# Patient Record
Sex: FEMALE | Race: WHITE
[De-identification: names, ages, dates, MRNs, and addresses within clinical notes are randomized per-mention and may not be internally consistent; named-entity substitution may affect disease eponyms.]

---

## 2017-04-26 ENCOUNTER — APPOINTMENT (OUTPATIENT)
Dept: BARIATRICS | Facility: CLINIC | Age: 62
End: 2017-04-26

## 2017-05-25 ENCOUNTER — APPOINTMENT (OUTPATIENT)
Dept: BARIATRICS | Facility: CLINIC | Age: 62
End: 2017-05-25

## 2017-05-25 VITALS
BODY MASS INDEX: 38 KG/M2 | SYSTOLIC BLOOD PRESSURE: 142 MMHG | DIASTOLIC BLOOD PRESSURE: 94 MMHG | HEIGHT: 59.5 IN | WEIGHT: 191 LBS

## 2017-05-25 DIAGNOSIS — E66.3 OVERWEIGHT: ICD-10-CM

## 2017-05-25 DIAGNOSIS — Z87.19 PERSONAL HISTORY OF OTHER DISEASES OF THE DIGESTIVE SYSTEM: ICD-10-CM

## 2017-05-25 DIAGNOSIS — Z91.11 PATIENT'S NONCOMPLIANCE WITH DIETARY REGIMEN: ICD-10-CM

## 2017-05-25 DIAGNOSIS — K25.3 ACUTE GASTRIC ULCER W/OUT HEMORRHAGE OR PERFORATION: ICD-10-CM

## 2017-05-25 RX ORDER — LOSARTAN POTASSIUM AND HYDROCHLOROTHIAZIDE 25; 100 MG/1; MG/1
100-25 TABLET ORAL
Refills: 0 | Status: ACTIVE | COMMUNITY

## 2017-05-25 RX ORDER — ATORVASTATIN CALCIUM 10 MG/1
10 TABLET, FILM COATED ORAL
Refills: 0 | Status: ACTIVE | COMMUNITY

## 2017-05-25 RX ORDER — AMOXICILLIN 500 MG/1
500 CAPSULE ORAL
Qty: 20 | Refills: 0 | Status: COMPLETED | COMMUNITY
Start: 2017-02-16

## 2017-05-26 PROBLEM — Z91.11 NONCOMPLIANCE WITH DIETARY REGIMEN REQUIRED STATUS POST BARIATRIC SURGERY: Status: RESOLVED | Noted: 2017-01-02 | Resolved: 2017-05-26

## 2017-06-08 ENCOUNTER — APPOINTMENT (OUTPATIENT)
Dept: BARIATRICS/WEIGHT MGMT | Facility: CLINIC | Age: 62
End: 2017-06-08

## 2017-06-13 VITALS — HEIGHT: 59.5 IN | WEIGHT: 188 LBS | BODY MASS INDEX: 37.4 KG/M2

## 2017-07-13 ENCOUNTER — APPOINTMENT (OUTPATIENT)
Dept: BARIATRICS/WEIGHT MGMT | Facility: CLINIC | Age: 62
End: 2017-07-13

## 2017-09-27 ENCOUNTER — APPOINTMENT (OUTPATIENT)
Dept: BARIATRICS | Facility: CLINIC | Age: 62
End: 2017-09-27
Payer: COMMERCIAL

## 2017-09-27 VITALS
BODY MASS INDEX: 37.6 KG/M2 | HEIGHT: 59.5 IN | WEIGHT: 189 LBS | SYSTOLIC BLOOD PRESSURE: 138 MMHG | DIASTOLIC BLOOD PRESSURE: 92 MMHG

## 2017-09-27 PROCEDURE — 99213 OFFICE O/P EST LOW 20 MIN: CPT

## 2018-04-12 ENCOUNTER — APPOINTMENT (OUTPATIENT)
Dept: MRI IMAGING | Facility: CLINIC | Age: 63
End: 2018-04-12

## 2018-04-12 ENCOUNTER — OUTPATIENT (OUTPATIENT)
Dept: OUTPATIENT SERVICES | Facility: HOSPITAL | Age: 63
LOS: 1 days | End: 2018-04-12
Payer: COMMERCIAL

## 2018-04-12 DIAGNOSIS — Z41.9 ENCOUNTER FOR PROCEDURE FOR PURPOSES OTHER THAN REMEDYING HEALTH STATE, UNSPECIFIED: Chronic | ICD-10-CM

## 2018-04-12 DIAGNOSIS — Z98.89 OTHER SPECIFIED POSTPROCEDURAL STATES: Chronic | ICD-10-CM

## 2018-04-12 DIAGNOSIS — Z98.1 ARTHRODESIS STATUS: Chronic | ICD-10-CM

## 2018-04-12 DIAGNOSIS — Z00.8 ENCOUNTER FOR OTHER GENERAL EXAMINATION: ICD-10-CM

## 2018-04-12 PROCEDURE — 73721 MRI JNT OF LWR EXTRE W/O DYE: CPT | Mod: 26,LT

## 2018-04-12 PROCEDURE — 73721 MRI JNT OF LWR EXTRE W/O DYE: CPT

## 2019-03-07 ENCOUNTER — OUTPATIENT (OUTPATIENT)
Dept: OUTPATIENT SERVICES | Facility: HOSPITAL | Age: 64
LOS: 1 days | End: 2019-03-07
Payer: COMMERCIAL

## 2019-03-07 ENCOUNTER — APPOINTMENT (OUTPATIENT)
Dept: BARIATRICS | Facility: CLINIC | Age: 64
End: 2019-03-07
Payer: COMMERCIAL

## 2019-03-07 VITALS
HEIGHT: 59.5 IN | DIASTOLIC BLOOD PRESSURE: 80 MMHG | OXYGEN SATURATION: 94 % | HEART RATE: 72 BPM | SYSTOLIC BLOOD PRESSURE: 120 MMHG | WEIGHT: 179.01 LBS | BODY MASS INDEX: 35.61 KG/M2

## 2019-03-07 DIAGNOSIS — R11.2 NAUSEA WITH VOMITING, UNSPECIFIED: ICD-10-CM

## 2019-03-07 DIAGNOSIS — Z98.1 ARTHRODESIS STATUS: Chronic | ICD-10-CM

## 2019-03-07 DIAGNOSIS — Z98.89 OTHER SPECIFIED POSTPROCEDURAL STATES: Chronic | ICD-10-CM

## 2019-03-07 DIAGNOSIS — E66.9 OBESITY, UNSPECIFIED: ICD-10-CM

## 2019-03-07 DIAGNOSIS — R13.10 DYSPHAGIA, UNSPECIFIED: ICD-10-CM

## 2019-03-07 DIAGNOSIS — Z98.84 BARIATRIC SURGERY STATUS: ICD-10-CM

## 2019-03-07 DIAGNOSIS — Z41.9 ENCOUNTER FOR PROCEDURE FOR PURPOSES OTHER THAN REMEDYING HEALTH STATE, UNSPECIFIED: Chronic | ICD-10-CM

## 2019-03-07 PROCEDURE — 99214 OFFICE O/P EST MOD 30 MIN: CPT | Mod: 25

## 2019-03-07 PROCEDURE — 74220 X-RAY XM ESOPHAGUS 1CNTRST: CPT

## 2019-03-07 PROCEDURE — S2083 ADJUSTMENT GASTRIC BAND: CPT

## 2019-03-07 PROCEDURE — 74220 X-RAY XM ESOPHAGUS 1CNTRST: CPT | Mod: 26

## 2019-03-07 NOTE — HISTORY OF PRESENT ILLNESS
[Procedure: ___] : Procedure performed: [unfilled]  [Date of Surgery: ___] : Date of Surgery:   [unfilled] [Surgeon Name:   ___] : Surgeon Name: Dr. RAMIREZ [de-identified] : Presents today with new symptoms of fatigue, dysphasia, tremors with postprandial nausea, reflux and occasional vomiting.

## 2019-03-07 NOTE — ASSESSMENT
[FreeTextEntry1] : Symptoms of postprandial nausea and dysphasia. Intermittent vomiting. He is new onset resting tremor she is currently following up with the neurologist and is due to see her primary care physician as well. The patient has not supplemented with daily multivitamins and she believed she was consuming a balanced diet to not warrant supplementation.  We've discussed the importance of vitamin supplementation at this time and she will resume a daily multivitamin as well as a B complex.\par \par Prescription for blood work has been issued.\par \par LAP-BAND adjustment has been performed to offset any postprandial nausea and/or dysphasia resulting in vomiting.\par \par Nutritional counseling has been provided. The patient is encouraged to remain calorie conscious and continue a low fat, low carbohydrate, high protein diet. Also, emphasis has been placed on the importance of adequate hydration, multi-vitamin supplementation and exercise.  (15 min)\par \par Follow up with primary care physician for continued evaluation of this new onset tremor.\par Follow up with me in 3 months. The patient will be notified of any laboratory abnormalities.\par

## 2019-03-07 NOTE — PROCEDURE
[FreeTextEntry1] : LAP-BAND adjustment was performed today. Using standard sterile technique, a 20-gauge Palomo needle was inserted into the LAP-BAND port.  This was accessed with single puncture.  3 cc were aspirated and 2 cc removed. The resulting net volume remains 1 cc.\par \par The patient was seated erect and tolerated water test without symptoms of reflux, dysphagia or regurgitation.\par \par Routine post adjustment nutritional counseling was provided. This patient should adhere to a liquid diet for the next 24-48 hours advancing one stage per day to regular as tolerated.

## 2019-03-07 NOTE — PHYSICAL EXAM
[Obese, well nourished, in no acute distress] : obese, well nourished, in no acute distress [Normal] : grossly intact [de-identified] : morbidly obese, soft, nontender, nondistended, positive bowel sounds in all 4 quadrants. No evidence of hernia or masses. Surgical incisions were well approximated and healed appropriately without erythema, induration or fluctuance. LAP-BAND port is easily palpable without tenderness. [de-identified] : Depressed

## 2019-03-07 NOTE — REVIEW OF SYSTEMS
[Fatigue] : fatigue [Vomiting] : vomiting [Reflux/Heartburn] : reflux/heartburn [Negative] : Allergic/Immunologic [de-identified] : Tremor

## 2019-03-22 ENCOUNTER — APPOINTMENT (OUTPATIENT)
Dept: OBGYN | Facility: CLINIC | Age: 64
End: 2019-03-22
Payer: COMMERCIAL

## 2019-03-22 VITALS
WEIGHT: 179 LBS | OXYGEN SATURATION: 98 % | HEART RATE: 68 BPM | DIASTOLIC BLOOD PRESSURE: 80 MMHG | SYSTOLIC BLOOD PRESSURE: 110 MMHG | BODY MASS INDEX: 35.61 KG/M2 | HEIGHT: 59.5 IN

## 2019-03-22 DIAGNOSIS — Z82.3 FAMILY HISTORY OF STROKE: ICD-10-CM

## 2019-03-22 DIAGNOSIS — Z81.8 FAMILY HISTORY OF OTHER MENTAL AND BEHAVIORAL DISORDERS: ICD-10-CM

## 2019-03-22 PROCEDURE — 99386 PREV VISIT NEW AGE 40-64: CPT

## 2019-03-22 NOTE — CHIEF COMPLAINT
[Annual Visit] : annual visit [FreeTextEntry1] : sister Sarah Urena referred\par stress eats \par does not speak with daughtr--she just moved to [place]--has her sick cat though

## 2019-03-22 NOTE — HISTORY OF PRESENT ILLNESS
[Good] : being in good health [Postmenopausal] : is postmenopausal [HPV Vaccine NA Due to Age] : HPV vaccine not available to patient due to age [Last Mammogram ___] : Last Mammogram was [unfilled] [Last Bone Density ___] : Last bone density studies [unfilled] [Last Colonoscopy ___] : Last colonoscopy [unfilled] [Last Pap ___] : Last cervical pap smear was [unfilled]

## 2019-03-24 LAB — HPV HIGH+LOW RISK DNA PNL CVX: NOT DETECTED

## 2019-03-27 LAB — CYTOLOGY CVX/VAG DOC THIN PREP: NORMAL

## 2019-09-19 ENCOUNTER — APPOINTMENT (OUTPATIENT)
Dept: BARIATRICS | Facility: CLINIC | Age: 64
End: 2019-09-19
Payer: COMMERCIAL

## 2019-09-19 ENCOUNTER — OUTPATIENT (OUTPATIENT)
Dept: OUTPATIENT SERVICES | Facility: HOSPITAL | Age: 64
LOS: 1 days | End: 2019-09-19
Payer: COMMERCIAL

## 2019-09-19 VITALS
DIASTOLIC BLOOD PRESSURE: 80 MMHG | HEIGHT: 59.5 IN | WEIGHT: 167.99 LBS | HEART RATE: 79 BPM | BODY MASS INDEX: 33.42 KG/M2 | OXYGEN SATURATION: 99 % | SYSTOLIC BLOOD PRESSURE: 120 MMHG

## 2019-09-19 DIAGNOSIS — Z98.89 OTHER SPECIFIED POSTPROCEDURAL STATES: Chronic | ICD-10-CM

## 2019-09-19 DIAGNOSIS — F32.9 MAJOR DEPRESSIVE DISORDER, SINGLE EPISODE, UNSPECIFIED: ICD-10-CM

## 2019-09-19 DIAGNOSIS — Z41.9 ENCOUNTER FOR PROCEDURE FOR PURPOSES OTHER THAN REMEDYING HEALTH STATE, UNSPECIFIED: Chronic | ICD-10-CM

## 2019-09-19 DIAGNOSIS — Z98.1 ARTHRODESIS STATUS: Chronic | ICD-10-CM

## 2019-09-19 DIAGNOSIS — E66.9 OBESITY, UNSPECIFIED: ICD-10-CM

## 2019-09-19 DIAGNOSIS — Z98.84 BARIATRIC SURGERY STATUS: ICD-10-CM

## 2019-09-19 DIAGNOSIS — R11.2 NAUSEA WITH VOMITING, UNSPECIFIED: ICD-10-CM

## 2019-09-19 PROCEDURE — 74220 X-RAY XM ESOPHAGUS 1CNTRST: CPT | Mod: 26

## 2019-09-19 PROCEDURE — 74220 X-RAY XM ESOPHAGUS 1CNTRST: CPT

## 2019-09-19 PROCEDURE — 99214 OFFICE O/P EST MOD 30 MIN: CPT

## 2019-09-19 RX ORDER — ALPRAZOLAM 0.25 MG/1
0.25 TABLET ORAL
Refills: 0 | Status: ACTIVE | COMMUNITY

## 2019-09-19 RX ORDER — ALPRAZOLAM 1 MG/1
1 TABLET ORAL
Refills: 0 | Status: ACTIVE | COMMUNITY

## 2019-09-23 NOTE — HISTORY OF PRESENT ILLNESS
[de-identified] : Presents with nausea and abdominal pain s/p recent Abdominoplasty and Breast augmentation by Dr Pelletier.   [Procedure: ___] : Procedure performed: [unfilled]  [Date of Surgery: ___] : Date of Surgery:   [unfilled] [Surgeon Name:   ___] : Surgeon Name: Dr. RAMIREZ

## 2019-09-23 NOTE — ASSESSMENT
[FreeTextEntry1] : Post operative nausea following recent abdominoplasty and breast augmentation.  VE done.  Images personally reviewed and discussed with the patient.  No signs of prolapse or obstruction.\par \par Abdominal symptoms likely unrelated to gastric band.  Consideration for post operative pain, discomfort as contributing factors.\par \par No further band adjustment indicated nor requested.\par \par Nutritional counseling has been provided. The patient is encouraged to remain calorie conscious and continue a low fat, low carbohydrate, high protein diet. Also, emphasis has been placed on the importance of adequate hydration, multi-vitamin supplementation and exercise.  (15 min)\par \par f/u with Plastic surgeon.  Will renew Rx for Zofran and PPIs\par f/u for routine Bariatric care in 6 months or sooner as needed.

## 2019-09-23 NOTE — PHYSICAL EXAM
[Obese, well nourished, in no acute distress] : obese, well nourished, in no acute distress [Normal] : grossly intact [de-identified] : morbidly obese, soft, nontender, nondistended, positive bowel sounds in all 4 quadrants. No evidence of hernia or masses. Surgical incisions were well approximated and healed appropriately without erythema, induration or fluctuance. LAP-BAND port is easily palpable without tenderness. [de-identified] : Depressed

## 2019-12-13 ENCOUNTER — MEDICATION RENEWAL (OUTPATIENT)
Age: 64
End: 2019-12-13

## 2020-11-19 ENCOUNTER — APPOINTMENT (OUTPATIENT)
Dept: OBGYN | Facility: CLINIC | Age: 65
End: 2020-11-19
Payer: MEDICARE

## 2020-11-19 VITALS
DIASTOLIC BLOOD PRESSURE: 84 MMHG | HEIGHT: 59.5 IN | OXYGEN SATURATION: 98 % | TEMPERATURE: 97.4 F | WEIGHT: 164 LBS | BODY MASS INDEX: 32.63 KG/M2 | HEART RATE: 78 BPM | SYSTOLIC BLOOD PRESSURE: 122 MMHG

## 2020-11-19 PROCEDURE — G0101: CPT

## 2020-11-19 RX ORDER — OMEPRAZOLE 20 MG/1
20 CAPSULE, DELAYED RELEASE ORAL
Qty: 30 | Refills: 3 | Status: DISCONTINUED | COMMUNITY
Start: 2019-09-19 | End: 2020-11-19

## 2020-11-19 RX ORDER — DOCUSATE SODIUM 100 MG/1
CAPSULE ORAL
Refills: 0 | Status: ACTIVE | COMMUNITY

## 2020-11-19 RX ORDER — ONDANSETRON 4 MG/1
4 TABLET ORAL EVERY 8 HOURS
Qty: 30 | Refills: 0 | Status: DISCONTINUED | COMMUNITY
Start: 2019-09-19 | End: 2020-11-19

## 2020-11-19 RX ORDER — MAGNESIUM OXIDE/MAG AA CHELATE 300 MG
CAPSULE ORAL
Refills: 0 | Status: ACTIVE | COMMUNITY

## 2020-11-19 NOTE — HISTORY OF PRESENT ILLNESS
[TextBox_4] : lives alone with sick cat\par pap 2019,[facility] pt--h./o HPV--wants DOV pap,mammo 2020,colon 2016,dexa 2017

## 2020-11-19 NOTE — PHYSICAL EXAM

## 2020-11-22 LAB — HPV HIGH+LOW RISK DNA PNL CVX: NOT DETECTED

## 2020-11-24 LAB — CYTOLOGY CVX/VAG DOC THIN PREP: NORMAL

## 2021-10-13 DIAGNOSIS — R92.2 INCONCLUSIVE MAMMOGRAM: ICD-10-CM

## 2022-02-01 ENCOUNTER — RESULT REVIEW (OUTPATIENT)
Age: 67
End: 2022-02-01

## 2022-02-01 ENCOUNTER — APPOINTMENT (OUTPATIENT)
Dept: OBGYN | Facility: CLINIC | Age: 67
End: 2022-02-01
Payer: MEDICARE

## 2022-02-01 DIAGNOSIS — R92.8 OTHER ABNORMAL AND INCONCLUSIVE FINDINGS ON DIAGNOSTIC IMAGING OF BREAST: ICD-10-CM

## 2022-02-01 PROCEDURE — 99213 OFFICE O/P EST LOW 20 MIN: CPT | Mod: 95

## 2022-02-09 ENCOUNTER — RESULT REVIEW (OUTPATIENT)
Age: 67
End: 2022-02-09

## 2022-02-09 ENCOUNTER — TRANSCRIPTION ENCOUNTER (OUTPATIENT)
Age: 67
End: 2022-02-09

## 2022-02-09 ENCOUNTER — OUTPATIENT (OUTPATIENT)
Dept: OUTPATIENT SERVICES | Facility: HOSPITAL | Age: 67
LOS: 1 days | End: 2022-02-09
Payer: MEDICARE

## 2022-02-09 ENCOUNTER — APPOINTMENT (OUTPATIENT)
Dept: ULTRASOUND IMAGING | Facility: IMAGING CENTER | Age: 67
End: 2022-02-09
Payer: MEDICARE

## 2022-02-09 ENCOUNTER — APPOINTMENT (OUTPATIENT)
Dept: MAMMOGRAPHY | Facility: IMAGING CENTER | Age: 67
End: 2022-02-09
Payer: MEDICARE

## 2022-02-09 DIAGNOSIS — Z98.89 OTHER SPECIFIED POSTPROCEDURAL STATES: Chronic | ICD-10-CM

## 2022-02-09 DIAGNOSIS — Z98.1 ARTHRODESIS STATUS: Chronic | ICD-10-CM

## 2022-02-09 DIAGNOSIS — R92.2 INCONCLUSIVE MAMMOGRAM: ICD-10-CM

## 2022-02-09 DIAGNOSIS — Z41.9 ENCOUNTER FOR PROCEDURE FOR PURPOSES OTHER THAN REMEDYING HEALTH STATE, UNSPECIFIED: Chronic | ICD-10-CM

## 2022-02-09 DIAGNOSIS — N64.89 OTHER SPECIFIED DISORDERS OF BREAST: ICD-10-CM

## 2022-02-09 DIAGNOSIS — Z98.82 BREAST IMPLANT STATUS: ICD-10-CM

## 2022-02-09 PROCEDURE — 77066 DX MAMMO INCL CAD BI: CPT

## 2022-02-09 PROCEDURE — 77066 DX MAMMO INCL CAD BI: CPT | Mod: 26

## 2022-03-07 ENCOUNTER — NON-APPOINTMENT (OUTPATIENT)
Age: 67
End: 2022-03-07

## 2022-03-07 DIAGNOSIS — Z87.19 PERSONAL HISTORY OF OTHER DISEASES OF THE DIGESTIVE SYSTEM: ICD-10-CM

## 2022-03-07 DIAGNOSIS — Z87.898 PERSONAL HISTORY OF OTHER SPECIFIED CONDITIONS: ICD-10-CM

## 2022-03-07 PROBLEM — N95.2 POSTMENOPAUSAL ATROPHIC VAGINITIS: Status: RESOLVED | Noted: 2019-05-07 | Resolved: 2022-03-07

## 2022-03-07 PROBLEM — Z98.82 H/O BILATERAL BREAST IMPLANTS: Status: RESOLVED | Noted: 2020-11-19 | Resolved: 2022-03-07

## 2022-03-07 PROBLEM — N64.89 BREAST ASYMMETRY: Status: RESOLVED | Noted: 2022-02-01 | Resolved: 2022-03-07

## 2022-03-09 ENCOUNTER — APPOINTMENT (OUTPATIENT)
Dept: OBGYN | Facility: CLINIC | Age: 67
End: 2022-03-09
Payer: MEDICARE

## 2022-03-09 VITALS
WEIGHT: 172 LBS | HEIGHT: 59 IN | BODY MASS INDEX: 34.68 KG/M2 | HEART RATE: 78 BPM | SYSTOLIC BLOOD PRESSURE: 125 MMHG | DIASTOLIC BLOOD PRESSURE: 81 MMHG

## 2022-03-09 DIAGNOSIS — N95.2 POSTMENOPAUSAL ATROPHIC VAGINITIS: ICD-10-CM

## 2022-03-09 DIAGNOSIS — Z86.39 PERSONAL HISTORY OF OTHER ENDOCRINE, NUTRITIONAL AND METABOLIC DISEASE: ICD-10-CM

## 2022-03-09 DIAGNOSIS — Z86.69 PERSONAL HISTORY OF OTHER DISEASES OF THE NERVOUS SYSTEM AND SENSE ORGANS: ICD-10-CM

## 2022-03-09 DIAGNOSIS — Z98.84 BARIATRIC SURGERY STATUS: ICD-10-CM

## 2022-03-09 DIAGNOSIS — Z98.82 BREAST IMPLANT STATUS: ICD-10-CM

## 2022-03-09 DIAGNOSIS — Z86.79 PERSONAL HISTORY OF OTHER DISEASES OF THE CIRCULATORY SYSTEM: ICD-10-CM

## 2022-03-09 DIAGNOSIS — N64.89 OTHER SPECIFIED DISORDERS OF BREAST: ICD-10-CM

## 2022-03-09 PROCEDURE — 99213 OFFICE O/P EST LOW 20 MIN: CPT | Mod: 25

## 2022-03-09 PROCEDURE — 56820 COLPOSCOPY VULVA: CPT

## 2022-03-09 NOTE — PROCEDURE
[Colposcopy] : Colposcopy  [Time out performed] : Pre-procedure time out performed.  Patient's name, date of birth and procedure confirmed. [Consent Obtained] : Consent obtained [Risks] : risks [Benefits] : benefits [Alternatives] : alternatives [Patient] : patient [Infection] : infection [Bleeding] : bleeding [Allergic Reaction] : allergic reaction [Tolerated Well] : the patient tolerated the procedure well [de-identified] : vulvar lesion [de-identified] : pimple noted on rt vulva,no invasive lesion

## 2022-03-09 NOTE — HISTORY OF PRESENT ILLNESS
[Mammogramdate] : 2022 [BreastSonogramDate] : 2022 [PapSmeardate] : 11/2020 [BoneDensityDate] : 2022 [ColonoscopyDate] : UTD

## 2022-10-19 ENCOUNTER — NON-APPOINTMENT (OUTPATIENT)
Age: 67
End: 2022-10-19

## 2023-04-14 ENCOUNTER — RESULT REVIEW (OUTPATIENT)
Age: 68
End: 2023-04-14

## 2023-04-14 ENCOUNTER — APPOINTMENT (OUTPATIENT)
Dept: ULTRASOUND IMAGING | Facility: IMAGING CENTER | Age: 68
End: 2023-04-14
Payer: MEDICARE

## 2023-04-14 ENCOUNTER — OUTPATIENT (OUTPATIENT)
Dept: OUTPATIENT SERVICES | Facility: HOSPITAL | Age: 68
LOS: 1 days | End: 2023-04-14
Payer: MEDICARE

## 2023-04-14 ENCOUNTER — APPOINTMENT (OUTPATIENT)
Dept: MAMMOGRAPHY | Facility: IMAGING CENTER | Age: 68
End: 2023-04-14
Payer: MEDICARE

## 2023-04-14 DIAGNOSIS — Z00.8 ENCOUNTER FOR OTHER GENERAL EXAMINATION: ICD-10-CM

## 2023-04-14 DIAGNOSIS — Z98.89 OTHER SPECIFIED POSTPROCEDURAL STATES: Chronic | ICD-10-CM

## 2023-04-14 DIAGNOSIS — Z98.1 ARTHRODESIS STATUS: Chronic | ICD-10-CM

## 2023-04-14 DIAGNOSIS — Z41.9 ENCOUNTER FOR PROCEDURE FOR PURPOSES OTHER THAN REMEDYING HEALTH STATE, UNSPECIFIED: Chronic | ICD-10-CM

## 2023-04-14 PROCEDURE — 77063 BREAST TOMOSYNTHESIS BI: CPT | Mod: 26

## 2023-04-14 PROCEDURE — 77067 SCR MAMMO BI INCL CAD: CPT | Mod: 26

## 2023-04-14 PROCEDURE — 77063 BREAST TOMOSYNTHESIS BI: CPT

## 2023-04-14 PROCEDURE — 77067 SCR MAMMO BI INCL CAD: CPT

## 2023-04-14 PROCEDURE — 76641 ULTRASOUND BREAST COMPLETE: CPT | Mod: 26,50,GA

## 2023-04-14 PROCEDURE — 76641 ULTRASOUND BREAST COMPLETE: CPT

## 2023-07-07 ENCOUNTER — INPATIENT (INPATIENT)
Facility: HOSPITAL | Age: 68
LOS: 1 days | Discharge: ANOTHER HOSPITAL | DRG: 446 | End: 2023-07-09
Attending: SURGERY | Admitting: SURGERY
Payer: MEDICARE

## 2023-07-07 VITALS
TEMPERATURE: 98 F | HEART RATE: 61 BPM | WEIGHT: 156.09 LBS | RESPIRATION RATE: 16 BRPM | HEIGHT: 59 IN | SYSTOLIC BLOOD PRESSURE: 191 MMHG | DIASTOLIC BLOOD PRESSURE: 82 MMHG | OXYGEN SATURATION: 97 %

## 2023-07-07 DIAGNOSIS — Z41.9 ENCOUNTER FOR PROCEDURE FOR PURPOSES OTHER THAN REMEDYING HEALTH STATE, UNSPECIFIED: Chronic | ICD-10-CM

## 2023-07-07 DIAGNOSIS — Z98.1 ARTHRODESIS STATUS: Chronic | ICD-10-CM

## 2023-07-07 DIAGNOSIS — Z98.89 OTHER SPECIFIED POSTPROCEDURAL STATES: Chronic | ICD-10-CM

## 2023-07-07 LAB
ALBUMIN SERPL ELPH-MCNC: 3.5 G/DL — SIGNIFICANT CHANGE UP (ref 3.3–5)
ALP SERPL-CCNC: 58 U/L — SIGNIFICANT CHANGE UP (ref 30–120)
ALT FLD-CCNC: 24 U/L DA — SIGNIFICANT CHANGE UP (ref 10–60)
AMYLASE P1 CFR SERPL: 51 U/L — SIGNIFICANT CHANGE UP (ref 25–125)
ANION GAP SERPL CALC-SCNC: 11 MMOL/L — SIGNIFICANT CHANGE UP (ref 5–17)
APPEARANCE UR: CLEAR — SIGNIFICANT CHANGE UP
APTT BLD: 30.9 SEC — SIGNIFICANT CHANGE UP (ref 27.5–35.5)
AST SERPL-CCNC: 30 U/L — SIGNIFICANT CHANGE UP (ref 10–40)
BASOPHILS # BLD AUTO: 0.04 K/UL — SIGNIFICANT CHANGE UP (ref 0–0.2)
BASOPHILS NFR BLD AUTO: 0.3 % — SIGNIFICANT CHANGE UP (ref 0–2)
BILIRUB SERPL-MCNC: 0.8 MG/DL — SIGNIFICANT CHANGE UP (ref 0.2–1.2)
BILIRUB UR-MCNC: NEGATIVE — SIGNIFICANT CHANGE UP
BLD GP AB SCN SERPL QL: SIGNIFICANT CHANGE UP
BUN SERPL-MCNC: 11 MG/DL — SIGNIFICANT CHANGE UP (ref 7–23)
CALCIUM SERPL-MCNC: 8.6 MG/DL — SIGNIFICANT CHANGE UP (ref 8.4–10.5)
CHLORIDE SERPL-SCNC: 95 MMOL/L — LOW (ref 96–108)
CO2 SERPL-SCNC: 28 MMOL/L — SIGNIFICANT CHANGE UP (ref 22–31)
COLOR SPEC: YELLOW — SIGNIFICANT CHANGE UP
CREAT SERPL-MCNC: 0.63 MG/DL — SIGNIFICANT CHANGE UP (ref 0.5–1.3)
DIFF PNL FLD: ABNORMAL
EGFR: 97 ML/MIN/1.73M2 — SIGNIFICANT CHANGE UP
EOSINOPHIL # BLD AUTO: 0.03 K/UL — SIGNIFICANT CHANGE UP (ref 0–0.5)
EOSINOPHIL NFR BLD AUTO: 0.3 % — SIGNIFICANT CHANGE UP (ref 0–6)
EPI CELLS # UR: PRESENT
GLUCOSE SERPL-MCNC: 109 MG/DL — HIGH (ref 70–99)
GLUCOSE UR QL: NEGATIVE MG/DL — SIGNIFICANT CHANGE UP
HCT VFR BLD CALC: 39.2 % — SIGNIFICANT CHANGE UP (ref 34.5–45)
HGB BLD-MCNC: 13.7 G/DL — SIGNIFICANT CHANGE UP (ref 11.5–15.5)
IMM GRANULOCYTES NFR BLD AUTO: 0.3 % — SIGNIFICANT CHANGE UP (ref 0–0.9)
INR BLD: 1.05 RATIO — SIGNIFICANT CHANGE UP (ref 0.88–1.16)
KETONES UR-MCNC: 15 MG/DL
LACTATE SERPL-SCNC: 0.8 MMOL/L — SIGNIFICANT CHANGE UP (ref 0.7–2)
LEUKOCYTE ESTERASE UR-ACNC: NEGATIVE — SIGNIFICANT CHANGE UP
LIDOCAIN IGE QN: 65 U/L — LOW (ref 73–393)
LYMPHOCYTES # BLD AUTO: 1.26 K/UL — SIGNIFICANT CHANGE UP (ref 1–3.3)
LYMPHOCYTES # BLD AUTO: 10.7 % — LOW (ref 13–44)
MCHC RBC-ENTMCNC: 30.1 PG — SIGNIFICANT CHANGE UP (ref 27–34)
MCHC RBC-ENTMCNC: 34.9 GM/DL — SIGNIFICANT CHANGE UP (ref 32–36)
MCV RBC AUTO: 86.2 FL — SIGNIFICANT CHANGE UP (ref 80–100)
MONOCYTES # BLD AUTO: 0.62 K/UL — SIGNIFICANT CHANGE UP (ref 0–0.9)
MONOCYTES NFR BLD AUTO: 5.3 % — SIGNIFICANT CHANGE UP (ref 2–14)
NEUTROPHILS # BLD AUTO: 9.79 K/UL — HIGH (ref 1.8–7.4)
NEUTROPHILS NFR BLD AUTO: 83.1 % — HIGH (ref 43–77)
NITRITE UR-MCNC: NEGATIVE — SIGNIFICANT CHANGE UP
NRBC # BLD: 0 /100 WBCS — SIGNIFICANT CHANGE UP (ref 0–0)
PH UR: 7.5 — SIGNIFICANT CHANGE UP (ref 5–8)
PLATELET # BLD AUTO: 205 K/UL — SIGNIFICANT CHANGE UP (ref 150–400)
POTASSIUM SERPL-MCNC: 3.4 MMOL/L — LOW (ref 3.5–5.3)
POTASSIUM SERPL-SCNC: 3.4 MMOL/L — LOW (ref 3.5–5.3)
PROT SERPL-MCNC: 7.7 G/DL — SIGNIFICANT CHANGE UP (ref 6–8.3)
PROT UR-MCNC: NEGATIVE MG/DL — SIGNIFICANT CHANGE UP
PROTHROM AB SERPL-ACNC: 12.1 SEC — SIGNIFICANT CHANGE UP (ref 10.5–13.4)
RBC # BLD: 4.55 M/UL — SIGNIFICANT CHANGE UP (ref 3.8–5.2)
RBC # FLD: 12.4 % — SIGNIFICANT CHANGE UP (ref 10.3–14.5)
RBC CASTS # UR COMP ASSIST: 0 /HPF — SIGNIFICANT CHANGE UP (ref 0–4)
SODIUM SERPL-SCNC: 134 MMOL/L — LOW (ref 135–145)
SP GR SPEC: 1.02 — SIGNIFICANT CHANGE UP (ref 1–1.03)
UROBILINOGEN FLD QL: 0.2 MG/DL — SIGNIFICANT CHANGE UP (ref 0.2–1)
WBC # BLD: 11.77 K/UL — HIGH (ref 3.8–10.5)
WBC # FLD AUTO: 11.77 K/UL — HIGH (ref 3.8–10.5)
WBC UR QL: 2 /HPF — SIGNIFICANT CHANGE UP (ref 0–5)

## 2023-07-07 PROCEDURE — 93010 ELECTROCARDIOGRAM REPORT: CPT

## 2023-07-07 PROCEDURE — 99285 EMERGENCY DEPT VISIT HI MDM: CPT

## 2023-07-07 PROCEDURE — 74177 CT ABD & PELVIS W/CONTRAST: CPT | Mod: 26,MA

## 2023-07-07 RX ORDER — POTASSIUM CHLORIDE 20 MEQ
40 PACKET (EA) ORAL ONCE
Refills: 0 | Status: COMPLETED | OUTPATIENT
Start: 2023-07-07 | End: 2023-07-07

## 2023-07-07 RX ORDER — FAMOTIDINE 10 MG/ML
20 INJECTION INTRAVENOUS ONCE
Refills: 0 | Status: COMPLETED | OUTPATIENT
Start: 2023-07-07 | End: 2023-07-07

## 2023-07-07 RX ORDER — ONDANSETRON 8 MG/1
4 TABLET, FILM COATED ORAL ONCE
Refills: 0 | Status: COMPLETED | OUTPATIENT
Start: 2023-07-07 | End: 2023-07-07

## 2023-07-07 RX ORDER — SODIUM CHLORIDE 9 MG/ML
1000 INJECTION INTRAMUSCULAR; INTRAVENOUS; SUBCUTANEOUS ONCE
Refills: 0 | Status: COMPLETED | OUTPATIENT
Start: 2023-07-07 | End: 2023-07-07

## 2023-07-07 RX ORDER — MORPHINE SULFATE 50 MG/1
4 CAPSULE, EXTENDED RELEASE ORAL ONCE
Refills: 0 | Status: DISCONTINUED | OUTPATIENT
Start: 2023-07-07 | End: 2023-07-07

## 2023-07-07 RX ORDER — HYDROMORPHONE HYDROCHLORIDE 2 MG/ML
0.5 INJECTION INTRAMUSCULAR; INTRAVENOUS; SUBCUTANEOUS ONCE
Refills: 0 | Status: DISCONTINUED | OUTPATIENT
Start: 2023-07-07 | End: 2023-07-07

## 2023-07-07 RX ADMIN — FAMOTIDINE 20 MILLIGRAM(S): 10 INJECTION INTRAVENOUS at 19:47

## 2023-07-07 RX ADMIN — Medication 40 MILLIEQUIVALENT(S): at 22:37

## 2023-07-07 RX ADMIN — MORPHINE SULFATE 4 MILLIGRAM(S): 50 CAPSULE, EXTENDED RELEASE ORAL at 20:15

## 2023-07-07 RX ADMIN — SODIUM CHLORIDE 1000 MILLILITER(S): 9 INJECTION INTRAMUSCULAR; INTRAVENOUS; SUBCUTANEOUS at 20:00

## 2023-07-07 RX ADMIN — HYDROMORPHONE HYDROCHLORIDE 0.5 MILLIGRAM(S): 2 INJECTION INTRAMUSCULAR; INTRAVENOUS; SUBCUTANEOUS at 22:37

## 2023-07-07 RX ADMIN — ONDANSETRON 4 MILLIGRAM(S): 8 TABLET, FILM COATED ORAL at 19:46

## 2023-07-07 RX ADMIN — MORPHINE SULFATE 4 MILLIGRAM(S): 50 CAPSULE, EXTENDED RELEASE ORAL at 19:48

## 2023-07-07 NOTE — ED ADULT NURSE NOTE - NSFALLUNIVINTERV_ED_ALL_ED
Bed/Stretcher in lowest position, wheels locked, appropriate side rails in place/Call bell, personal items and telephone in reach/Instruct patient to call for assistance before getting out of bed/chair/stretcher/Non-slip footwear applied when patient is off stretcher/[place] to call system/Physically safe environment - no spills, clutter or unnecessary equipment/Purposeful proactive rounding/Room/bathroom lighting operational, light cord in reach

## 2023-07-07 NOTE — ED ADULT NURSE NOTE - NSICDXPASTMEDICALHX_GEN_ALL_CORE_FT
PAST MEDICAL HISTORY:  Anxiety     Depression     DJD (Degenerative Joint Disease) s/p back surgery    GERD (Gastroesophageal Reflux Disease)     Hyperlipemia     Hypertension

## 2023-07-07 NOTE — ED ADULT NURSE NOTE - NSICDXPASTSURGICALHX_GEN_ALL_CORE_FT
PAST SURGICAL HISTORY:  Carpal Tunnel Syndrome Bilateral hands (2013)    Cataract Bilateral eyes (2010)    Elective surgery S/p Lap Band (2013)    History of colonoscopy (2007)    Rectocele (2007)    S/P endoscopy (2008)    S/P lumbar fusion L4-L5 lumbar fusion (2008)

## 2023-07-07 NOTE — ED ADULT NURSE NOTE - NSICDXFAMILYHX_GEN_ALL_CORE_FT
FAMILY HISTORY:  Father  Still living? Yes, Estimated age: 81-90  CAD (coronary artery disease), Age at diagnosis: Age Unknown  Diabetes mellitus, Age at diagnosis: Age Unknown  Family history of dementia, Age at diagnosis: Age Unknown    Mother  Still living? No  Family history of esophageal cancer, Age at diagnosis: Age Unknown

## 2023-07-07 NOTE — ED ADULT TRIAGE NOTE - HISTORY OF COVID-19 VACCINATION
Yes
PMI and heart sounds localize heart on left side of chest; murmurs absent; pulse with normal variation, frequency and intensity (amplitude or strength) with equal intensity on upper and lower extremities; blood pressure value(s) are adequate.

## 2023-07-07 NOTE — ED ADULT NURSE NOTE - OBJECTIVE STATEMENT
c/o abd pain, hx gastric ulcers, denies N/V/D. NAD. as per pt. " I am tired, took xanax before coming here"

## 2023-07-08 DIAGNOSIS — K81.0 ACUTE CHOLECYSTITIS: ICD-10-CM

## 2023-07-08 LAB
ALBUMIN SERPL ELPH-MCNC: 3.8 G/DL — SIGNIFICANT CHANGE UP (ref 3.3–5)
ALP SERPL-CCNC: 212 U/L — HIGH (ref 30–120)
ALT FLD-CCNC: 378 U/L DA — HIGH (ref 10–60)
ANION GAP SERPL CALC-SCNC: 9 MMOL/L — SIGNIFICANT CHANGE UP (ref 5–17)
AST SERPL-CCNC: 563 U/L — HIGH (ref 10–40)
BASOPHILS # BLD AUTO: 0.03 K/UL — SIGNIFICANT CHANGE UP (ref 0–0.2)
BASOPHILS NFR BLD AUTO: 0.3 % — SIGNIFICANT CHANGE UP (ref 0–2)
BILIRUB SERPL-MCNC: 3.7 MG/DL — HIGH (ref 0.2–1.2)
BUN SERPL-MCNC: 10 MG/DL — SIGNIFICANT CHANGE UP (ref 7–23)
CALCIUM SERPL-MCNC: 9 MG/DL — SIGNIFICANT CHANGE UP (ref 8.4–10.5)
CHLORIDE SERPL-SCNC: 94 MMOL/L — LOW (ref 96–108)
CO2 SERPL-SCNC: 32 MMOL/L — HIGH (ref 22–31)
CREAT SERPL-MCNC: 0.93 MG/DL — SIGNIFICANT CHANGE UP (ref 0.5–1.3)
EGFR: 67 ML/MIN/1.73M2 — SIGNIFICANT CHANGE UP
EOSINOPHIL # BLD AUTO: 0 K/UL — SIGNIFICANT CHANGE UP (ref 0–0.5)
EOSINOPHIL NFR BLD AUTO: 0 % — SIGNIFICANT CHANGE UP (ref 0–6)
GLUCOSE SERPL-MCNC: 154 MG/DL — HIGH (ref 70–99)
HCT VFR BLD CALC: 39.1 % — SIGNIFICANT CHANGE UP (ref 34.5–45)
HGB BLD-MCNC: 13.5 G/DL — SIGNIFICANT CHANGE UP (ref 11.5–15.5)
IMM GRANULOCYTES NFR BLD AUTO: 0.3 % — SIGNIFICANT CHANGE UP (ref 0–0.9)
LYMPHOCYTES # BLD AUTO: 0.81 K/UL — LOW (ref 1–3.3)
LYMPHOCYTES # BLD AUTO: 7.5 % — LOW (ref 13–44)
MCHC RBC-ENTMCNC: 29.8 PG — SIGNIFICANT CHANGE UP (ref 27–34)
MCHC RBC-ENTMCNC: 34.5 GM/DL — SIGNIFICANT CHANGE UP (ref 32–36)
MCV RBC AUTO: 86.3 FL — SIGNIFICANT CHANGE UP (ref 80–100)
MONOCYTES # BLD AUTO: 0.83 K/UL — SIGNIFICANT CHANGE UP (ref 0–0.9)
MONOCYTES NFR BLD AUTO: 7.7 % — SIGNIFICANT CHANGE UP (ref 2–14)
NEUTROPHILS # BLD AUTO: 9.07 K/UL — HIGH (ref 1.8–7.4)
NEUTROPHILS NFR BLD AUTO: 84.2 % — HIGH (ref 43–77)
NRBC # BLD: 0 /100 WBCS — SIGNIFICANT CHANGE UP (ref 0–0)
PLATELET # BLD AUTO: 242 K/UL — SIGNIFICANT CHANGE UP (ref 150–400)
POTASSIUM SERPL-MCNC: 3.5 MMOL/L — SIGNIFICANT CHANGE UP (ref 3.5–5.3)
POTASSIUM SERPL-SCNC: 3.5 MMOL/L — SIGNIFICANT CHANGE UP (ref 3.5–5.3)
PROT SERPL-MCNC: 7.6 G/DL — SIGNIFICANT CHANGE UP (ref 6–8.3)
RBC # BLD: 4.53 M/UL — SIGNIFICANT CHANGE UP (ref 3.8–5.2)
RBC # FLD: 12.3 % — SIGNIFICANT CHANGE UP (ref 10.3–14.5)
SODIUM SERPL-SCNC: 135 MMOL/L — SIGNIFICANT CHANGE UP (ref 135–145)
WBC # BLD: 10.77 K/UL — HIGH (ref 3.8–10.5)
WBC # FLD AUTO: 10.77 K/UL — HIGH (ref 3.8–10.5)

## 2023-07-08 PROCEDURE — 99223 1ST HOSP IP/OBS HIGH 75: CPT | Mod: FS

## 2023-07-08 PROCEDURE — 99223 1ST HOSP IP/OBS HIGH 75: CPT

## 2023-07-08 PROCEDURE — 76705 ECHO EXAM OF ABDOMEN: CPT | Mod: 26

## 2023-07-08 RX ORDER — MORPHINE SULFATE 50 MG/1
2 CAPSULE, EXTENDED RELEASE ORAL EVERY 4 HOURS
Refills: 0 | Status: DISCONTINUED | OUTPATIENT
Start: 2023-07-08 | End: 2023-07-09

## 2023-07-08 RX ORDER — SENNA PLUS 8.6 MG/1
2 TABLET ORAL AT BEDTIME
Refills: 0 | Status: DISCONTINUED | OUTPATIENT
Start: 2023-07-08 | End: 2023-07-09

## 2023-07-08 RX ORDER — PIPERACILLIN AND TAZOBACTAM 4; .5 G/20ML; G/20ML
3.38 INJECTION, POWDER, LYOPHILIZED, FOR SOLUTION INTRAVENOUS ONCE
Refills: 0 | Status: COMPLETED | OUTPATIENT
Start: 2023-07-08 | End: 2023-07-08

## 2023-07-08 RX ORDER — LOSARTAN POTASSIUM 100 MG/1
100 TABLET, FILM COATED ORAL DAILY
Refills: 0 | Status: DISCONTINUED | OUTPATIENT
Start: 2023-07-08 | End: 2023-07-09

## 2023-07-08 RX ORDER — SODIUM CHLORIDE 9 MG/ML
1000 INJECTION, SOLUTION INTRAVENOUS ONCE
Refills: 0 | Status: COMPLETED | OUTPATIENT
Start: 2023-07-08 | End: 2023-07-08

## 2023-07-08 RX ORDER — PANTOPRAZOLE SODIUM 20 MG/1
40 TABLET, DELAYED RELEASE ORAL DAILY
Refills: 0 | Status: DISCONTINUED | OUTPATIENT
Start: 2023-07-08 | End: 2023-07-09

## 2023-07-08 RX ORDER — VORTIOXETINE 5 MG/1
30 TABLET, FILM COATED ORAL DAILY
Refills: 0 | Status: DISCONTINUED | OUTPATIENT
Start: 2023-07-08 | End: 2023-07-09

## 2023-07-08 RX ORDER — LANOLIN ALCOHOL/MO/W.PET/CERES
5 CREAM (GRAM) TOPICAL AT BEDTIME
Refills: 0 | Status: DISCONTINUED | OUTPATIENT
Start: 2023-07-08 | End: 2023-07-09

## 2023-07-08 RX ORDER — SODIUM CHLORIDE 9 MG/ML
1000 INJECTION, SOLUTION INTRAVENOUS
Refills: 0 | Status: DISCONTINUED | OUTPATIENT
Start: 2023-07-08 | End: 2023-07-09

## 2023-07-08 RX ORDER — HEPARIN SODIUM 5000 [USP'U]/ML
5000 INJECTION INTRAVENOUS; SUBCUTANEOUS EVERY 8 HOURS
Refills: 0 | Status: DISCONTINUED | OUTPATIENT
Start: 2023-07-08 | End: 2023-07-09

## 2023-07-08 RX ORDER — PIPERACILLIN AND TAZOBACTAM 4; .5 G/20ML; G/20ML
3.38 INJECTION, POWDER, LYOPHILIZED, FOR SOLUTION INTRAVENOUS EVERY 8 HOURS
Refills: 0 | Status: DISCONTINUED | OUTPATIENT
Start: 2023-07-08 | End: 2023-07-09

## 2023-07-08 RX ORDER — MORPHINE SULFATE 50 MG/1
4 CAPSULE, EXTENDED RELEASE ORAL EVERY 4 HOURS
Refills: 0 | Status: DISCONTINUED | OUTPATIENT
Start: 2023-07-08 | End: 2023-07-09

## 2023-07-08 RX ORDER — LOSARTAN POTASSIUM 100 MG/1
100 TABLET, FILM COATED ORAL DAILY
Refills: 0 | Status: DISCONTINUED | OUTPATIENT
Start: 2023-07-08 | End: 2023-07-08

## 2023-07-08 RX ORDER — OXYCODONE HYDROCHLORIDE 5 MG/1
5 TABLET ORAL EVERY 4 HOURS
Refills: 0 | Status: DISCONTINUED | OUTPATIENT
Start: 2023-07-08 | End: 2023-07-08

## 2023-07-08 RX ORDER — ACETAMINOPHEN 500 MG
650 TABLET ORAL EVERY 6 HOURS
Refills: 0 | Status: DISCONTINUED | OUTPATIENT
Start: 2023-07-08 | End: 2023-07-09

## 2023-07-08 RX ADMIN — MORPHINE SULFATE 2 MILLIGRAM(S): 50 CAPSULE, EXTENDED RELEASE ORAL at 22:07

## 2023-07-08 RX ADMIN — Medication 5 MILLIGRAM(S): at 22:07

## 2023-07-08 RX ADMIN — SODIUM CHLORIDE 1000 MILLILITER(S): 9 INJECTION, SOLUTION INTRAVENOUS at 05:13

## 2023-07-08 RX ADMIN — PIPERACILLIN AND TAZOBACTAM 25 GRAM(S): 4; .5 INJECTION, POWDER, LYOPHILIZED, FOR SOLUTION INTRAVENOUS at 05:50

## 2023-07-08 RX ADMIN — HEPARIN SODIUM 5000 UNIT(S): 5000 INJECTION INTRAVENOUS; SUBCUTANEOUS at 13:51

## 2023-07-08 RX ADMIN — OXYCODONE HYDROCHLORIDE 5 MILLIGRAM(S): 5 TABLET ORAL at 05:35

## 2023-07-08 RX ADMIN — OXYCODONE HYDROCHLORIDE 5 MILLIGRAM(S): 5 TABLET ORAL at 05:13

## 2023-07-08 RX ADMIN — MORPHINE SULFATE 4 MILLIGRAM(S): 50 CAPSULE, EXTENDED RELEASE ORAL at 07:55

## 2023-07-08 RX ADMIN — OXYCODONE HYDROCHLORIDE 5 MILLIGRAM(S): 5 TABLET ORAL at 19:15

## 2023-07-08 RX ADMIN — OXYCODONE HYDROCHLORIDE 5 MILLIGRAM(S): 5 TABLET ORAL at 12:34

## 2023-07-08 RX ADMIN — SENNA PLUS 2 TABLET(S): 8.6 TABLET ORAL at 22:07

## 2023-07-08 RX ADMIN — MORPHINE SULFATE 2 MILLIGRAM(S): 50 CAPSULE, EXTENDED RELEASE ORAL at 22:30

## 2023-07-08 RX ADMIN — OXYCODONE HYDROCHLORIDE 5 MILLIGRAM(S): 5 TABLET ORAL at 13:15

## 2023-07-08 RX ADMIN — PIPERACILLIN AND TAZOBACTAM 25 GRAM(S): 4; .5 INJECTION, POWDER, LYOPHILIZED, FOR SOLUTION INTRAVENOUS at 22:07

## 2023-07-08 RX ADMIN — SODIUM CHLORIDE 100 MILLILITER(S): 9 INJECTION, SOLUTION INTRAVENOUS at 22:14

## 2023-07-08 RX ADMIN — MORPHINE SULFATE 4 MILLIGRAM(S): 50 CAPSULE, EXTENDED RELEASE ORAL at 08:30

## 2023-07-08 RX ADMIN — PIPERACILLIN AND TAZOBACTAM 200 GRAM(S): 4; .5 INJECTION, POWDER, LYOPHILIZED, FOR SOLUTION INTRAVENOUS at 02:08

## 2023-07-08 RX ADMIN — HEPARIN SODIUM 5000 UNIT(S): 5000 INJECTION INTRAVENOUS; SUBCUTANEOUS at 05:50

## 2023-07-08 RX ADMIN — OXYCODONE HYDROCHLORIDE 5 MILLIGRAM(S): 5 TABLET ORAL at 18:42

## 2023-07-08 RX ADMIN — PANTOPRAZOLE SODIUM 40 MILLIGRAM(S): 20 TABLET, DELAYED RELEASE ORAL at 12:36

## 2023-07-08 RX ADMIN — HEPARIN SODIUM 5000 UNIT(S): 5000 INJECTION INTRAVENOUS; SUBCUTANEOUS at 22:11

## 2023-07-08 RX ADMIN — LOSARTAN POTASSIUM 100 MILLIGRAM(S): 100 TABLET, FILM COATED ORAL at 12:34

## 2023-07-08 RX ADMIN — PIPERACILLIN AND TAZOBACTAM 25 GRAM(S): 4; .5 INJECTION, POWDER, LYOPHILIZED, FOR SOLUTION INTRAVENOUS at 13:52

## 2023-07-08 NOTE — H&P ADULT - ASSESSMENT
Abdominal pain, following large meal.  Pain improved, but not resolved.  Vitals reassuring since admission.  Abdomen with some tenderness most focal to periumbilical region without lashon RUQ tenderness or Nielsen's sign/ peritoneal findings.  Labs initially reassuring.  Now with marked elevation of LFT's.  Sono more suggestive of cholecystitis.  However, CT with CBD of 11mm and intrahepatic dilation.  Gastric CT findings noted, though report of EGD as normal "a few months ago, somewhat reassuring.  In light of LFT's, will hold off on cholecystectomy.  Will allow clear liquids for now as surgically stable.  Appreciate Hospitalist and GI assistance.  Unable to obtain MRCP or HIDA on weekends.  May need to facilitate transfer for same or to facilitate ERCP, if and as required.

## 2023-07-08 NOTE — H&P ADULT - NSHPLABSRESULTS_GEN_ALL_CORE
ACC: [id number] EXAM:  US ABDOMEN RT UPR QUADRANT   ORDERED BY: RANDY HOOKS   PROCEDURE DATE:  07/08/2023    INTERPRETATION:  CLINICAL INFORMATION: Rule out cholecystitis  COMPARISON: CT abdomen from same day  TECHNIQUE: Sonography of the right upper quadrant.    FINDINGS:  Liver: Within normal limits.  Bile ducts: Normal caliber. Common bile duct measures 8 mm.  Gallbladder: Cholelithiasis. Gallbladder wall edema and thickening is   present, measuring up to 7 mm. No evidence of pericholecystic fluid or   sonographic Nielsen sign.    IMPRESSION:  Cholelithiasis, without convincing sonographic evidence of acute cholecystitis.    --- End of Report ---     ABHINAV TOLEDO MD; Attending Radiologist  This document has been electronically signed. Jul 8 2023  1:28AM      ACC: [id number] EXAM:  CT ABDOMEN AND PELVIS IC   ORDERED BY: RANDY HOOKS   PROCEDURE DATE:  07/07/2023    INTERPRETATION:  CLINICAL INFORMATION: Epigastric pain, nausea  COMPARISON: MRI pelvis 6/6/2007.    CONTRAST/COMPLICATIONS:  IV Contrast: Omnipaque 350  90 cc administered   10 cc discarded  Oral Contrast: NONE  Complications: None reported at time of study completion    PROCEDURE:  CT of the Abdomen and Pelvis was performed.  Sagittal and coronal reformats were performed.    FINDINGS:  LOWER CHEST: Within normal limits. Breast implants noted.  LIVER: Within normal limits.  BILE DUCTS: Mild intrahepatic biliary ductal dilatation present.   Extrahepatic CBD is measuring up to 11 mm.  GALLBLADDER: Cholelithiasis. Gallbladder wall edema is present with   cholelithiasis at the gallbladder fundus as well as at the gallbladder   neck.  SPLEEN: 9 mm hypodensity present.  PANCREAS: Within normal limits.  ADRENALS: Within normal limits.  KIDNEYS/URETERS: Within normal limits.  BLADDER: Within normal limits.  REPRODUCTIVE ORGANS: Uterus and adnexa within normal limits.  BOWEL: LAP-BAND apparatus in appropriate positioning. The gastric fundus   is markedly thickened with focal outpouching present along the distal   greater curvature. No bowel obstruction. Appendix is normal.  PERITONEUM: No ascites.  VESSELS: Within normal limits.  RETROPERITONEUM/LYMPH NODES: No lymphadenopathy.  ABDOMINAL WALL: Within normal limits. Along the peritoneum, there are   moderate inflammatory changes present, which may be related to   cystocele/rectocele an pelvic floor instability seen on prior MRI from   2007.  BONES: Posterior lumbar fusion surgical hardware present from L4 to S1,   with grade 1 anterolisthesis L4 over L5 and chronic pars interarticularis   defect on the left at L4. Irregularity along the endplates at disc space   at L3-L4.    IMPRESSION:  Thickened gallbladder wall with cholelithiasis and biliary ductal   dilatation, suggestive of acute cholecystitis in theappropriate clinical   setting.    Markedly thickened gastric fundus with focal outpouching concerning for   gastritis and/or peptic ulcer disease. Lap-band apparatus in appropriate   position.    Degenerative changes of the lumbar spine status post posterior lumbar   fusion from L4 to S1, with endplate irregularity at L3-L4. Findings   likely represent chronic degenerative changes, although other etiologies   are not entirely excluded and MRI lumbar spine is advised if further   characterizationis warranted.    --- End of Report ---     ABHINAV TOLEDO MD; Attending Radiologist  This document has been electronically signed. Jul 7 2023 11:06PM

## 2023-07-08 NOTE — H&P ADULT - HISTORY OF PRESENT ILLNESS
67-year-old female with a history of HLD, HTN, DJD, GERD, anxiety, depression presents with epigastric abdominal pain.    She states that she ate a very large meal last night around 8:30 PM.    Approximately 5 hours later patient woke up with severe epigastric pain and nausea.    She attempted to have a bowel movement but was unable to do so.    Earlier she took 2 stool softeners which resulted in 2 soft and normal bowel movements.   No vomiting, diarrhea, fever.    She states it is sharp and constant, and now radiating more to the right side.    She took Xanax and Gas-X without relief.   Ms. Hansen has a history of a Lap-Band procedure 7 years ago as well as a tummy tuck with reportedly a negative EGD "several months ago."

## 2023-07-08 NOTE — ED PROVIDER NOTE - CLINICAL SUMMARY MEDICAL DECISION MAKING FREE TEXT BOX
67 year old female p/w epigastric pain and cramping that started early this morning.  No v/d/f.  Patient is passing gas and had 2 normal BMs today but reports persistent pain.  Check labs, lipase, UA, CT abd/pelvis/ RUQ sono, consult surgery as needed

## 2023-07-08 NOTE — ED PROVIDER NOTE - DIFFERENTIAL DIAGNOSIS
Ddx includes but is not limited to biliary colic, acute cholecystitis, cholangitis, pancreatitis, GERD, PUD, CAD Differential Diagnosis

## 2023-07-08 NOTE — PATIENT PROFILE ADULT - VISION (WITH CORRECTIVE LENSES IF THE PATIENT USUALLY WEARS THEM):
Wears Glasses/Normal vision: sees adequately in most situations; can see medication labels, newsprint

## 2023-07-08 NOTE — PATIENT PROFILE ADULT - FALL HARM RISK - UNIVERSAL INTERVENTIONS
Bed in lowest position, wheels locked, appropriate side rails in place/Call bell, personal items and telephone in reach/Instruct patient to call for assistance before getting out of bed or chair/Non-slip footwear when patient is out of bed/[place] to call system/Physically safe environment - no spills, clutter or unnecessary equipment/Purposeful Proactive Rounding/Room/bathroom lighting operational, light cord in reach

## 2023-07-08 NOTE — ED PROVIDER NOTE - GASTROINTESTINAL, MLM
Abdomen non-distended, epigastric/supraumbilical tenderness with no rebound or guarding, +BS, no CVA tenderness, no pulsatile mass. Negative Nielsen's sign

## 2023-07-08 NOTE — PATIENT PROFILE ADULT - PATIENT'S PREFERRED PRONOUN
Left message for patient at      Telephone Information:   Mobile [phone]    to schedule procedure.  Patient to return call to Pardeep T [phone].     Her/She

## 2023-07-08 NOTE — ED PROVIDER NOTE - NOTES
Dr. Johnson will perform the procedure but likely will not be done until Monday.  D/w patient and family member who will wait for Dr. Johnson Dr. Estrada evaluated patient at bedside but patient states her bariatric procedure was performed by Dr. Sawyer 7 years ago and she would prefer surgery to be performed by bariatric team Dr. Johnson will perform the procedure but likely will not be done until Monday.  D/w patient and family member who will wait for Dr. Johnson.  Dr. Johnson requesting medicine consultation.

## 2023-07-08 NOTE — CONSULT NOTE ADULT - ASSESSMENT
68 yo fem h/o lap gastric band who presented with abd pain, CT showing acute cholecystitis., HTN  -Medicine admission to control her HTN  -I recommended her to have lap cholecystectomy since her abd pain is not improving after several doses of IV pain meds  -Patient would like to have Dr Sawyer sees her since he knows her  -IV Abx  -NPO 68 yo fem h/o lap gastric band who presented with abd pain, CT showing acute cholecystitis., HTN  -Medicine admission to control her HTN  -I recommended her to have lap cholecystectomy since her abd pain is not improving after several doses of IV pain meds  -Patient would like to have Dr Sawyer sees her since he knows her  -IV Abx  -NPO  -Awaiting for US reading

## 2023-07-08 NOTE — H&P ADULT - GASTROINTESTINAL COMMENTS
Full/ obese, well healed operative incisions c/w given history, port site grossly WNL, moderate periumbilical tenderness to moderate palpation, +/- right sided abdominal pain to deeper palpation, no lashon Nielsen's sign or peritoneal findings. As per HPI.

## 2023-07-08 NOTE — CONSULT NOTE ADULT - ASSESSMENT
66 yo female, pmh of htn, hld, gerd, lap band, depression/anxiety, presenting with acute cholecystitis and likely choledocholithaisis  - noted elevated LFTs, bili this am, trend   - admitted to surgery  - consideration for GI eval, MRCP/ERCP  - continue zosyn  - follow up cultures  - on clear liquids      HTN  - hold hctz, BP mildly elevated, continue losartan  - continue lopressor      HLD - statin held due to transaminitis    gerd - protonix  66 yo female, pmh of htn, hld, gerd, lap band, depression/anxiety, presenting with acute cholecystitis and likely choledocholithaisis  - noted elevated LFTs, bili this am, trend   - admitted to surgery  - consideration for GI eval, MRCP/ERCP - Dr. Oakes aware  - continue zosyn  - follow up cultures  - on clear liquids      HTN  - hold hctz, BP mildly elevated, continue losartan  - continue lopressor      HLD - statin held due to transaminitis    gerd - protonix

## 2023-07-08 NOTE — ED PROVIDER NOTE - OBJECTIVE STATEMENT
67-year-old female with a history of HLD, HTN, DJD, GERD, anxiety, depression presents with epigastric abdominal pain.  Patient states that she ate a very large meal last night around 8:30 PM.  Approximately 5 hours later patient woke up with severe epigastric pain and nausea.  She attempted to have a bowel movement but was unable to do so.  Earlier today she took 2 stool softeners which resulted in 2 soft and normal bowel movements.  Daughter at bedside states that she has been passing gas.  No vomiting, diarrhea, fever.  Patient states that despite having bowel movements her pain has persisted.  She states it is sharp and constant, no radiation.  She took Xanax and Gas-X without relief.  She has a history of a Lap-Band procedure 7 years ago as well as a tummy tuck.  PMD Blair Hammond, GI Jin Tompkins

## 2023-07-09 ENCOUNTER — INPATIENT (INPATIENT)
Facility: HOSPITAL | Age: 68
LOS: 4 days | Discharge: HOME | DRG: 419 | End: 2023-07-14
Attending: INTERNAL MEDICINE | Admitting: INTERNAL MEDICINE
Payer: MEDICARE

## 2023-07-09 VITALS
SYSTOLIC BLOOD PRESSURE: 129 MMHG | OXYGEN SATURATION: 95 % | TEMPERATURE: 99 F | RESPIRATION RATE: 18 BRPM | DIASTOLIC BLOOD PRESSURE: 62 MMHG | HEART RATE: 91 BPM

## 2023-07-09 VITALS
SYSTOLIC BLOOD PRESSURE: 120 MMHG | DIASTOLIC BLOOD PRESSURE: 73 MMHG | OXYGEN SATURATION: 92 % | TEMPERATURE: 99 F | RESPIRATION RATE: 15 BRPM

## 2023-07-09 DIAGNOSIS — R10.9 UNSPECIFIED ABDOMINAL PAIN: ICD-10-CM

## 2023-07-09 DIAGNOSIS — Z41.9 ENCOUNTER FOR PROCEDURE FOR PURPOSES OTHER THAN REMEDYING HEALTH STATE, UNSPECIFIED: Chronic | ICD-10-CM

## 2023-07-09 DIAGNOSIS — Z98.89 OTHER SPECIFIED POSTPROCEDURAL STATES: Chronic | ICD-10-CM

## 2023-07-09 DIAGNOSIS — Z98.1 ARTHRODESIS STATUS: Chronic | ICD-10-CM

## 2023-07-09 LAB
ALBUMIN SERPL ELPH-MCNC: 2.6 G/DL — LOW (ref 3.3–5)
ALBUMIN SERPL ELPH-MCNC: 2.9 G/DL — LOW (ref 3.3–5)
ALP SERPL-CCNC: 187 U/L — HIGH (ref 30–120)
ALP SERPL-CCNC: 228 U/L — HIGH (ref 30–120)
ALT FLD-CCNC: 158 U/L DA — HIGH (ref 10–60)
ALT FLD-CCNC: 226 U/L DA — HIGH (ref 10–60)
ANION GAP SERPL CALC-SCNC: 11 MMOL/L — SIGNIFICANT CHANGE UP (ref 5–17)
ANION GAP SERPL CALC-SCNC: 8 MMOL/L — SIGNIFICANT CHANGE UP (ref 5–17)
AST SERPL-CCNC: 131 U/L — HIGH (ref 10–40)
AST SERPL-CCNC: 68 U/L — HIGH (ref 10–40)
BASOPHILS # BLD AUTO: 0.04 K/UL — SIGNIFICANT CHANGE UP (ref 0–0.2)
BASOPHILS NFR BLD AUTO: 0.2 % — SIGNIFICANT CHANGE UP (ref 0–2)
BILIRUB DIRECT SERPL-MCNC: 7.3 MG/DL — HIGH (ref 0–0.3)
BILIRUB INDIRECT FLD-MCNC: 0.7 MG/DL — SIGNIFICANT CHANGE UP (ref 0.2–1)
BILIRUB SERPL-MCNC: 6.2 MG/DL — HIGH (ref 0.2–1.2)
BILIRUB SERPL-MCNC: 8 MG/DL — HIGH (ref 0.2–1.2)
BUN SERPL-MCNC: 8 MG/DL — SIGNIFICANT CHANGE UP (ref 7–23)
BUN SERPL-MCNC: 8 MG/DL — SIGNIFICANT CHANGE UP (ref 7–23)
CALCIUM SERPL-MCNC: 8.8 MG/DL — SIGNIFICANT CHANGE UP (ref 8.4–10.5)
CALCIUM SERPL-MCNC: 9 MG/DL — SIGNIFICANT CHANGE UP (ref 8.4–10.5)
CHLORIDE SERPL-SCNC: 103 MMOL/L — SIGNIFICANT CHANGE UP (ref 96–108)
CHLORIDE SERPL-SCNC: 98 MMOL/L — SIGNIFICANT CHANGE UP (ref 96–108)
CO2 SERPL-SCNC: 28 MMOL/L — SIGNIFICANT CHANGE UP (ref 22–31)
CO2 SERPL-SCNC: 30 MMOL/L — SIGNIFICANT CHANGE UP (ref 22–31)
CREAT SERPL-MCNC: 0.89 MG/DL — SIGNIFICANT CHANGE UP (ref 0.5–1.3)
CREAT SERPL-MCNC: 1.08 MG/DL — SIGNIFICANT CHANGE UP (ref 0.5–1.3)
EGFR: 56 ML/MIN/1.73M2 — LOW
EGFR: 71 ML/MIN/1.73M2 — SIGNIFICANT CHANGE UP
EOSINOPHIL # BLD AUTO: 0 K/UL — SIGNIFICANT CHANGE UP (ref 0–0.5)
EOSINOPHIL NFR BLD AUTO: 0 % — SIGNIFICANT CHANGE UP (ref 0–6)
GLUCOSE SERPL-MCNC: 120 MG/DL — HIGH (ref 70–99)
GLUCOSE SERPL-MCNC: 174 MG/DL — HIGH (ref 70–99)
HCT VFR BLD CALC: 37.9 % — SIGNIFICANT CHANGE UP (ref 34.5–45)
HCV AB S/CO SERPL IA: 0.06 S/CO — SIGNIFICANT CHANGE UP (ref 0–0.99)
HCV AB SERPL-IMP: SIGNIFICANT CHANGE UP
HGB BLD-MCNC: 12.9 G/DL — SIGNIFICANT CHANGE UP (ref 11.5–15.5)
IMM GRANULOCYTES NFR BLD AUTO: 0.4 % — SIGNIFICANT CHANGE UP (ref 0–0.9)
LYMPHOCYTES # BLD AUTO: 1.3 K/UL — SIGNIFICANT CHANGE UP (ref 1–3.3)
LYMPHOCYTES # BLD AUTO: 7.9 % — LOW (ref 13–44)
MAGNESIUM SERPL-MCNC: 1.5 MG/DL — LOW (ref 1.6–2.6)
MCHC RBC-ENTMCNC: 29.6 PG — SIGNIFICANT CHANGE UP (ref 27–34)
MCHC RBC-ENTMCNC: 34 GM/DL — SIGNIFICANT CHANGE UP (ref 32–36)
MCV RBC AUTO: 86.9 FL — SIGNIFICANT CHANGE UP (ref 80–100)
MONOCYTES # BLD AUTO: 1.25 K/UL — HIGH (ref 0–0.9)
MONOCYTES NFR BLD AUTO: 7.6 % — SIGNIFICANT CHANGE UP (ref 2–14)
NEUTROPHILS # BLD AUTO: 13.78 K/UL — HIGH (ref 1.8–7.4)
NEUTROPHILS NFR BLD AUTO: 83.9 % — HIGH (ref 43–77)
NRBC # BLD: 0 /100 WBCS — SIGNIFICANT CHANGE UP (ref 0–0)
PLATELET # BLD AUTO: 200 K/UL — SIGNIFICANT CHANGE UP (ref 150–400)
POTASSIUM SERPL-MCNC: 3.1 MMOL/L — LOW (ref 3.5–5.3)
POTASSIUM SERPL-MCNC: 3.8 MMOL/L — SIGNIFICANT CHANGE UP (ref 3.5–5.3)
POTASSIUM SERPL-SCNC: 3.1 MMOL/L — LOW (ref 3.5–5.3)
POTASSIUM SERPL-SCNC: 3.8 MMOL/L — SIGNIFICANT CHANGE UP (ref 3.5–5.3)
PROT SERPL-MCNC: 6.4 G/DL — SIGNIFICANT CHANGE UP (ref 6–8.3)
PROT SERPL-MCNC: 6.8 G/DL — SIGNIFICANT CHANGE UP (ref 6–8.3)
RBC # BLD: 4.36 M/UL — SIGNIFICANT CHANGE UP (ref 3.8–5.2)
RBC # FLD: 12.8 % — SIGNIFICANT CHANGE UP (ref 10.3–14.5)
SODIUM SERPL-SCNC: 137 MMOL/L — SIGNIFICANT CHANGE UP (ref 135–145)
SODIUM SERPL-SCNC: 141 MMOL/L — SIGNIFICANT CHANGE UP (ref 135–145)
WBC # BLD: 16.44 K/UL — HIGH (ref 3.8–10.5)
WBC # FLD AUTO: 16.44 K/UL — HIGH (ref 3.8–10.5)

## 2023-07-09 PROCEDURE — 86850 RBC ANTIBODY SCREEN: CPT

## 2023-07-09 PROCEDURE — 76000 FLUOROSCOPY <1 HR PHYS/QHP: CPT

## 2023-07-09 PROCEDURE — C1769: CPT

## 2023-07-09 PROCEDURE — 87635 SARS-COV-2 COVID-19 AMP PRB: CPT

## 2023-07-09 PROCEDURE — 36415 COLL VENOUS BLD VENIPUNCTURE: CPT

## 2023-07-09 PROCEDURE — 76705 ECHO EXAM OF ABDOMEN: CPT

## 2023-07-09 PROCEDURE — 85610 PROTHROMBIN TIME: CPT

## 2023-07-09 PROCEDURE — 87040 BLOOD CULTURE FOR BACTERIA: CPT

## 2023-07-09 PROCEDURE — 99285 EMERGENCY DEPT VISIT HI MDM: CPT | Mod: 25

## 2023-07-09 PROCEDURE — 85027 COMPLETE CBC AUTOMATED: CPT

## 2023-07-09 PROCEDURE — C9399: CPT

## 2023-07-09 PROCEDURE — C2625: CPT

## 2023-07-09 PROCEDURE — 83735 ASSAY OF MAGNESIUM: CPT

## 2023-07-09 PROCEDURE — 86900 BLOOD TYPING SEROLOGIC ABO: CPT

## 2023-07-09 PROCEDURE — 99233 SBSQ HOSP IP/OBS HIGH 50: CPT | Mod: FS

## 2023-07-09 PROCEDURE — 86803 HEPATITIS C AB TEST: CPT

## 2023-07-09 PROCEDURE — 86901 BLOOD TYPING SEROLOGIC RH(D): CPT

## 2023-07-09 PROCEDURE — 80061 LIPID PANEL: CPT

## 2023-07-09 PROCEDURE — C9290: CPT

## 2023-07-09 PROCEDURE — 84100 ASSAY OF PHOSPHORUS: CPT

## 2023-07-09 PROCEDURE — C1889: CPT

## 2023-07-09 PROCEDURE — 99233 SBSQ HOSP IP/OBS HIGH 50: CPT

## 2023-07-09 PROCEDURE — 74177 CT ABD & PELVIS W/CONTRAST: CPT | Mod: MA

## 2023-07-09 PROCEDURE — 96374 THER/PROPH/DIAG INJ IV PUSH: CPT

## 2023-07-09 PROCEDURE — 88304 TISSUE EXAM BY PATHOLOGIST: CPT

## 2023-07-09 PROCEDURE — 80076 HEPATIC FUNCTION PANEL: CPT

## 2023-07-09 PROCEDURE — 85025 COMPLETE CBC W/AUTO DIFF WBC: CPT

## 2023-07-09 PROCEDURE — 96375 TX/PRO/DX INJ NEW DRUG ADDON: CPT

## 2023-07-09 PROCEDURE — 93005 ELECTROCARDIOGRAM TRACING: CPT

## 2023-07-09 PROCEDURE — S2900: CPT

## 2023-07-09 PROCEDURE — 80048 BASIC METABOLIC PNL TOTAL CA: CPT

## 2023-07-09 PROCEDURE — 80053 COMPREHEN METABOLIC PANEL: CPT

## 2023-07-09 PROCEDURE — 83690 ASSAY OF LIPASE: CPT

## 2023-07-09 PROCEDURE — 82247 BILIRUBIN TOTAL: CPT

## 2023-07-09 PROCEDURE — 83605 ASSAY OF LACTIC ACID: CPT

## 2023-07-09 PROCEDURE — 82150 ASSAY OF AMYLASE: CPT

## 2023-07-09 PROCEDURE — 85730 THROMBOPLASTIN TIME PARTIAL: CPT

## 2023-07-09 PROCEDURE — 82248 BILIRUBIN DIRECT: CPT

## 2023-07-09 PROCEDURE — 81001 URINALYSIS AUTO W/SCOPE: CPT

## 2023-07-09 DEVICE — GWIRE JAGWIRE 0.025IN X 260CM: Type: IMPLANTABLE DEVICE | Status: FUNCTIONAL

## 2023-07-09 DEVICE — AUTOTOME CANNULATING SPHINCTEROTOME RX 44 20MM: Type: IMPLANTABLE DEVICE | Status: FUNCTIONAL

## 2023-07-09 DEVICE — GWIRE JAGTOME REVOLUTION RX 260CM/0.025IN: Type: IMPLANTABLE DEVICE | Status: FUNCTIONAL

## 2023-07-09 DEVICE — STENT ADVANIX SINGLE PIGTAIL 5X4CM: Type: IMPLANTABLE DEVICE | Status: FUNCTIONAL

## 2023-07-09 RX ORDER — POTASSIUM CHLORIDE 20 MEQ
10 PACKET (EA) ORAL
Refills: 0 | Status: COMPLETED | OUTPATIENT
Start: 2023-07-09 | End: 2023-07-09

## 2023-07-09 RX ORDER — MAGNESIUM SULFATE 500 MG/ML
2 VIAL (ML) INJECTION ONCE
Refills: 0 | Status: COMPLETED | OUTPATIENT
Start: 2023-07-09 | End: 2023-07-09

## 2023-07-09 RX ORDER — ONDANSETRON 8 MG/1
4 TABLET, FILM COATED ORAL ONCE
Refills: 0 | Status: DISCONTINUED | OUTPATIENT
Start: 2023-07-09 | End: 2023-07-09

## 2023-07-09 RX ORDER — DEXTROSE MONOHYDRATE, SODIUM CHLORIDE, AND POTASSIUM CHLORIDE 50; .745; 4.5 G/1000ML; G/1000ML; G/1000ML
1000 INJECTION, SOLUTION INTRAVENOUS
Refills: 0 | Status: DISCONTINUED | OUTPATIENT
Start: 2023-07-09 | End: 2023-07-09

## 2023-07-09 RX ORDER — HYDROMORPHONE HYDROCHLORIDE 2 MG/ML
0.5 INJECTION INTRAMUSCULAR; INTRAVENOUS; SUBCUTANEOUS
Refills: 0 | Status: DISCONTINUED | OUTPATIENT
Start: 2023-07-09 | End: 2023-07-09

## 2023-07-09 RX ORDER — HYDROMORPHONE HYDROCHLORIDE 2 MG/ML
0.25 INJECTION INTRAMUSCULAR; INTRAVENOUS; SUBCUTANEOUS
Refills: 0 | Status: DISCONTINUED | OUTPATIENT
Start: 2023-07-09 | End: 2023-07-09

## 2023-07-09 RX ORDER — SODIUM CHLORIDE 9 MG/ML
1000 INJECTION, SOLUTION INTRAVENOUS
Refills: 0 | Status: DISCONTINUED | OUTPATIENT
Start: 2023-07-09 | End: 2023-07-09

## 2023-07-09 RX ADMIN — PIPERACILLIN AND TAZOBACTAM 25 GRAM(S): 4; .5 INJECTION, POWDER, LYOPHILIZED, FOR SOLUTION INTRAVENOUS at 15:34

## 2023-07-09 RX ADMIN — Medication 25 GRAM(S): at 11:02

## 2023-07-09 RX ADMIN — HEPARIN SODIUM 5000 UNIT(S): 5000 INJECTION INTRAVENOUS; SUBCUTANEOUS at 06:26

## 2023-07-09 RX ADMIN — SODIUM CHLORIDE 75 MILLILITER(S): 9 INJECTION, SOLUTION INTRAVENOUS at 15:20

## 2023-07-09 RX ADMIN — LOSARTAN POTASSIUM 100 MILLIGRAM(S): 100 TABLET, FILM COATED ORAL at 06:24

## 2023-07-09 RX ADMIN — SODIUM CHLORIDE 100 MILLILITER(S): 9 INJECTION, SOLUTION INTRAVENOUS at 06:24

## 2023-07-09 RX ADMIN — Medication 100 MILLIEQUIVALENT(S): at 15:54

## 2023-07-09 RX ADMIN — Medication 100 MILLIEQUIVALENT(S): at 10:02

## 2023-07-09 RX ADMIN — PANTOPRAZOLE SODIUM 40 MILLIGRAM(S): 20 TABLET, DELAYED RELEASE ORAL at 11:02

## 2023-07-09 RX ADMIN — MORPHINE SULFATE 2 MILLIGRAM(S): 50 CAPSULE, EXTENDED RELEASE ORAL at 06:27

## 2023-07-09 RX ADMIN — MORPHINE SULFATE 2 MILLIGRAM(S): 50 CAPSULE, EXTENDED RELEASE ORAL at 06:40

## 2023-07-09 RX ADMIN — Medication 100 MILLIEQUIVALENT(S): at 10:52

## 2023-07-09 RX ADMIN — PIPERACILLIN AND TAZOBACTAM 25 GRAM(S): 4; .5 INJECTION, POWDER, LYOPHILIZED, FOR SOLUTION INTRAVENOUS at 06:24

## 2023-07-09 RX ADMIN — HEPARIN SODIUM 5000 UNIT(S): 5000 INJECTION INTRAVENOUS; SUBCUTANEOUS at 21:37

## 2023-07-09 RX ADMIN — PIPERACILLIN AND TAZOBACTAM 25 GRAM(S): 4; .5 INJECTION, POWDER, LYOPHILIZED, FOR SOLUTION INTRAVENOUS at 21:38

## 2023-07-09 NOTE — PROGRESS NOTE ADULT - SUBJECTIVE AND OBJECTIVE BOX
Patient is a 67y old  Female who presents with a chief complaint of Abdominal Pain. (09 Jul 2023 11:32)      INTERVAL HPI/OVERNIGHT EVENTS:  Patient seen and examined in am, abdominal pain persistent, denies fever, tmax 100 overnight, no vomiting    ROS reviewed and is otherwise negative        Vital Signs Last 24 Hrs  T(C): 36.9 (09 Jul 2023 08:14), Max: 37.8 (08 Jul 2023 23:05)  T(F): 98.4 (09 Jul 2023 08:14), Max: 100 (08 Jul 2023 23:05)  HR: 89 (09 Jul 2023 08:14) (76 - 99)  BP: 123/75 (09 Jul 2023 08:14) (111/75 - 135/88)  BP(mean): --  RR: 16 (09 Jul 2023 08:14) (15 - 16)  SpO2: 100% (09 Jul 2023 08:14) (91% - 100%)    Parameters below as of 09 Jul 2023 08:14  Patient On (Oxygen Delivery Method): room air        PHYSICAL EXAM:  GENERAL: NAD, older female  HEAD:  Atraumatic, Normocephalic  EYES: EOMI, PERRLA, conjunctiva and sclera clear  ENMT: No tonsillar erythema, exudate ; Moist mucous membrane   NECK: Supple, No JVD   NERVOUS SYSTEM:  Alert & Oriented X3, Good concentration; no focal deficits  CHEST/LUNG: Clear to percussion bilaterally; No rales, rhonchi, wheezing, or rubs  HEART: Regular rate and rhythm; No murmurs, rubs, or gallops  ABDOMEN: Soft, obese, + RUQ tenderness appears increased with light palpation, + guarding  EXTREMITIES:  2+ Peripheral Pulses, No clubbing, cyanosis, or edema  LYMPH: No lymphadenopathy   SKIN: No rashes or lesions      MEDICATIONS  (STANDING):  heparin   Injectable 5000 Unit(s) SubCutaneous every 8 hours  lactated ringers. 1000 milliLiter(s) (100 mL/Hr) IV Continuous <Continuous>  losartan 100 milliGRAM(s) Oral daily  pantoprazole  Injectable 40 milliGRAM(s) IV Push daily  piperacillin/tazobactam IVPB.. 3.375 Gram(s) IV Intermittent every 8 hours  potassium chloride  10 mEq/100 mL IVPB 10 milliEquivalent(s) IV Intermittent every 1 hour  senna 2 Tablet(s) Oral at bedtime  vortioxetine 30 milliGRAM(s) Oral daily    MEDICATIONS  (PRN):  acetaminophen     Tablet .. 650 milliGRAM(s) Oral every 6 hours PRN Temp greater or equal to 38C (100.4F), Mild Pain (1 - 3)  melatonin 5 milliGRAM(s) Oral at bedtime PRN Insomnia  morphine  - Injectable 4 milliGRAM(s) IV Push every 4 hours PRN Severe Pain (7 - 10)  morphine  - Injectable 2 milliGRAM(s) IV Push every 4 hours PRN Moderate Pain (4 - 6)      Allergies    No Known Allergies    Intolerances          LABS:                        12.9   16.44 )-----------( 200      ( 09 Jul 2023 06:41 )             37.9     09 Jul 2023 06:41    137    |  98     |  8      ----------------------------<  120    3.1     |  28     |  0.89     Ca    8.8        09 Jul 2023 06:41  Mg     1.5       09 Jul 2023 06:41    TPro  6.4    /  Alb  2.9    /  TBili  8.0    /  DBili  7.3    /  AST  131    /  ALT  226    /  AlkPhos  228    09 Jul 2023 06:41    PT/INR - ( 07 Jul 2023 19:47 )   PT: 12.1 sec;   INR: 1.05 ratio         PTT - ( 07 Jul 2023 19:47 )  PTT:30.9 sec  Urinalysis Basic - ( 09 Jul 2023 06:41 )    Color: x / Appearance: x / SG: x / pH: x  Gluc: 120 mg/dL / Ketone: x  / Bili: x / Urobili: x   Blood: x / Protein: x / Nitrite: x   Leuk Esterase: x / RBC: x / WBC x   Sq Epi: x / Non Sq Epi: x / Bacteria: x      CAPILLARY BLOOD GLUCOSE          RADIOLOGY & ADDITIONAL TESTS:        Care Discussed with Consultants/Other Providers:    Advanced Directives: [ ] DNR  [ ] No feeding tube  [ ] MOLST in chart  [ ] MOLST completed today  [ ] Unknown  
s/p ercp     upper gastrointestinal endoscopy performed first  tortous esophagus  lap band noted 2cm distal to ge junction; no signs of erosion  mild gastritis, no signs of pud  normal duodenum    ercp    ampulla noted at distal end of long intraduodenal segment  biliary cannulation was unsuccessful despite trial of multiple wires, double wire technique and cannulation not successful after placement of 5F by 4cm pancreatic stent    patient transported to pacu in stable condition, comfortable, no distress    rec:   npo  cont iv antibiotics  will need transfer to tertiary care for eus +/- reattempt ercp if bilirubin and wbc remain elevated  d/w patient bedside in pacu  d/w favio consuelo at [phone]
Quiet overnight.  Patient seen prior to ERCP in holding.  Reports pain improving, but not yet resolved.  Now for ERCP today in place of MRCP given am lab results.  Took clear liquids well overnight without issue.    Vital Signs Last 24 Hrs  T(C): 36.9 (09 Jul 2023 08:14), Max: 37.8 (08 Jul 2023 23:05)  T(F): 98.4 (09 Jul 2023 08:14), Max: 100 (08 Jul 2023 23:05)  HR: 89 (09 Jul 2023 08:14) (76 - 99)  BP: 123/75 (09 Jul 2023 08:14) (111/75 - 135/88)  RR: 16 (09 Jul 2023 08:14) (15 - 16)  SpO2: 100% (09 Jul 2023 08:14) (91% - 100%)    Parameters below as of 09 Jul 2023 08:14  Patient On (Oxygen Delivery Method): room air    SUBJECTIVE: Pt seen resting comfortably in bed.    Pain: YES   Pain (0-10): "2... 3..."                Nausea or Vomiting: NO           Flatus or Bowel Movement: YES, flatus                Void: YES     General Appearance: Appears in NAD, but no visibly jaundice.  Neck: Supple  Chest: Equal expansion bilaterally  CV: Pulse regular presently  Abdomen: Soft, nontense, mild epigastric tenderness to palpation, +/- right sided abdominal tenderness or "discomfort" to palpation.  Extremities: Grossly symmetric with SCD's in place     I&O's Detail    08 Jul 2023 07:01  -  09 Jul 2023 07:00  --------------------------------------------------------  IN:    Lactated Ringers: 1200 mL  Total IN: 1200 mL    OUT:  Total OUT: 0 mL    Total NET: 1200 mL    MEDICATIONS  (STANDING):  heparin   Injectable 5000 Unit(s) SubCutaneous every 8 hours  losartan 100 milliGRAM(s) Oral daily  pantoprazole  Injectable 40 milliGRAM(s) IV Push daily  piperacillin/tazobactam IVPB.. 3.375 Gram(s) IV Intermittent every 8 hours  potassium chloride  10 mEq/100 mL IVPB 10 milliEquivalent(s) IV Intermittent every 1 hour  senna 2 Tablet(s) Oral at bedtime  sodium chloride 0.9% with potassium chloride 20 mEq/L 1000 milliLiter(s) (75 mL/Hr) IV Continuous <Continuous>  vortioxetine 30 milliGRAM(s) Oral daily    MEDICATIONS  (PRN):  acetaminophen     Tablet .. 650 milliGRAM(s) Oral every 6 hours PRN Temp greater or equal to 38C (100.4F), Mild Pain (1 - 3)  melatonin 5 milliGRAM(s) Oral at bedtime PRN Insomnia  morphine  - Injectable 2 milliGRAM(s) IV Push every 4 hours PRN Moderate Pain (4 - 6)  morphine  - Injectable 4 milliGRAM(s) IV Push every 4 hours PRN Severe Pain (7 - 10)    LABS:                        12.9   16.44 )-----------( 200      ( 09 Jul 2023 06:41 )             37.9     07-09    137  |  98  |  8   ----------------------------<  120<H>  3.1<L>   |  28  |  0.89    Ca    8.8      09 Jul 2023 06:41  Mg     1.5     07-09    TPro  6.4  /  Alb  2.9<L>  /  TBili  8.0<H>  /  DBili  7.3<H>  /  AST  131<H>  /  ALT  226<H>  /  AlkPhos  228<H>  07-09    PT/INR - ( 07 Jul 2023 19:47 )   PT: 12.1 sec;   INR: 1.05 ratio    PTT - ( 07 Jul 2023 19:47 )  PTT:30.9 sec    Urinalysis Basic - ( 09 Jul 2023 06:41 )  Color: x / Appearance: x / SG: x / pH: x  Gluc: 120 mg/dL / Ketone: x  / Bili: x / Urobili: x   Blood: x / Protein: x / Nitrite: x   Leuk Esterase: x / RBC: x / WBC x   Sq Epi: x / Non Sq Epi: x / Bacteria: x

## 2023-07-09 NOTE — PATIENT PROFILE ADULT - FALL HARM RISK - HARM RISK INTERVENTIONS

## 2023-07-09 NOTE — CONSULT NOTE ADULT - SUBJECTIVE AND OBJECTIVE BOX
[facility]  Guillermo Munoz PA-C  [address] [place] [address] [id number] [phone]      Chief Complaint:  Patient is a 67y old  Female who presents with a chief complaint of Abdominal Pain. (08 Jul 2023 19:19)      HPI: 67-year-old female with a history of HLD, HTN, DJD, GERD, anxiety, depression presents with epigastric abdominal pain.    She states that she ate a very large meal last night around 8:30 PM.    Approximately 5 hours later patient woke up with severe epigastric pain and nausea.    She attempted to have a bowel movement but was unable to do so.    Earlier she took 2 stool softeners which resulted in 2 soft and normal bowel movements.   No vomiting, diarrhea, fever.    She states it is sharp and constant, and now radiating more to the right side.    She took Xanax and Gas-X without relief.   Ms. Hansen has a history of a Lap-Band procedure 7 years ago as well as a tummy tuck with reportedly a negative EGD "several months ago."    Allergies:  No Known Allergies      Medications:  acetaminophen     Tablet .. 650 milliGRAM(s) Oral every 6 hours PRN  heparin   Injectable 5000 Unit(s) SubCutaneous every 8 hours  lactated ringers. 1000 milliLiter(s) IV Continuous <Continuous>  losartan 100 milliGRAM(s) Oral daily  melatonin 5 milliGRAM(s) Oral at bedtime PRN  morphine  - Injectable 2 milliGRAM(s) IV Push every 4 hours PRN  morphine  - Injectable 4 milliGRAM(s) IV Push every 4 hours PRN  pantoprazole  Injectable 40 milliGRAM(s) IV Push daily  piperacillin/tazobactam IVPB.. 3.375 Gram(s) IV Intermittent every 8 hours  potassium chloride  10 mEq/100 mL IVPB 10 milliEquivalent(s) IV Intermittent every 1 hour  senna 2 Tablet(s) Oral at bedtime  vortioxetine 30 milliGRAM(s) Oral daily      PMHX/PSHX:  DJD (Degenerative Joint Disease)    DJD (Degenerative Joint Disease)    CTS (Carpal Tunnel Syndrome)    Chronic Sinusitis    Hypertension    Hyperlipemia    GERD (Gastroesophageal Reflux Disease)    Depression    Anxiety    Carpal Tunnel Syndrome    Cataract    Rectocele    S/P lumbar fusion    Elective surgery    History of colonoscopy    S/P endoscopy        Family history:  CAD (coronary artery disease) (Father)    Diabetes mellitus (Father)    Family history of dementia (Father)    Family history of esophageal cancer (Mother)        Social History:     ROS:     General:  no fevers, chills, night sweats, fatigue,   Eyes:  Good vision, no reported pain  ENT:  No sore throat, pain, runny nose, dysphagia  CV:  No pain, palpitations, hypo/hypertension  Resp:  No dyspnea, cough, tachypnea, wheezing  GI:   + pain, No nausea, No vomiting, No diarrhea, No constipation, No weight loss, No fever, No pruritis, No rectal bleeding, No tarry stools, No dysphagia,  :  No pain, bleeding, incontinence, nocturia  Muscle:  No pain, weakness  Neuro:  No weakness, tingling, memory problems  Psych:  No fatigue, insomnia, mood problems, depression  Endocrine:  No polyuria, polydipsia, cold/heat intolerance  Heme:  No petechiae, ecchymosis, easy bruisability  Skin:  No rash, tattoos, scars, edema      PHYSICAL EXAM:   Vital Signs:  Vital Signs Last 24 Hrs  T(C): 36.9 (09 Jul 2023 08:14), Max: 37.8 (08 Jul 2023 23:05)  T(F): 98.4 (09 Jul 2023 08:14), Max: 100 (08 Jul 2023 23:05)  HR: 89 (09 Jul 2023 08:14) (76 - 99)  BP: 123/75 (09 Jul 2023 08:14) (111/75 - 135/88)  BP(mean): --  RR: 16 (09 Jul 2023 08:14) (15 - 16)  SpO2: 100% (09 Jul 2023 08:14) (91% - 100%)    Parameters below as of 09 Jul 2023 08:14  Patient On (Oxygen Delivery Method): room air      Daily     Daily     GENERAL:  Appears stated age,   HEENT:  NC/AT,  + icterus  CHEST:  Full & symmetric excursion,   HEART:  Regular rhythm  ABDOMEN:  Soft, non-tender, non-distended,   EXTEREMITIES:  no cyanosis,clubbing or edema  SKIN:  No rash Jaundiced  NEURO:  Alert,    LABS:                        12.9   16.44 )-----------( 200      ( 09 Jul 2023 06:41 )             37.9     07-09    137  |  98  |  8   ----------------------------<  120<H>  3.1<L>   |  28  |  0.89    Ca    8.8      09 Jul 2023 06:41  Mg     1.5     07-09    TPro  6.4  /  Alb  2.9<L>  /  TBili  8.0<H>  /  DBili  7.3<H>  /  AST  131<H>  /  ALT  226<H>  /  AlkPhos  228<H>  07-09    LIVER FUNCTIONS - ( 09 Jul 2023 06:41 )  Alb: 2.9 g/dL / Pro: 6.4 g/dL / ALK PHOS: 228 U/L / ALT: 226 U/L DA / AST: 131 U/L / GGT: x           PT/INR - ( 07 Jul 2023 19:47 )   PT: 12.1 sec;   INR: 1.05 ratio         PTT - ( 07 Jul 2023 19:47 )  PTT:30.9 sec  Urinalysis Basic - ( 09 Jul 2023 06:41 )    Color: x / Appearance: x / SG: x / pH: x  Gluc: 120 mg/dL / Ketone: x  / Bili: x / Urobili: x   Blood: x / Protein: x / Nitrite: x   Leuk Esterase: x / RBC: x / WBC x   Sq Epi: x / Non Sq Epi: x / Bacteria: x          Imaging:          
HPI:  66 yo female, pmh of htn, hld, depression, gerd, h/o lap band surgery, presenting with acute onset abdominal pain starting 2 days ago, in RUQ, originally thought to be due to constipation, no vomiting, does have decreased appetite and oral intake, no diarrhea, no fevers. No current chest pain, shortness of breath, swelling.     PAST MEDICAL & SURGICAL HISTORY:  DJD (Degenerative Joint Disease)  s/p back surgery      Hypertension      Hyperlipemia      GERD (Gastroesophageal Reflux Disease)      Depression      Anxiety      Carpal Tunnel Syndrome  Bilateral hands (2013)      Cataract  Bilateral eyes (2010)      Rectocele  (2007)      S/P lumbar fusion  L4-L5 lumbar fusion (2008)      Elective surgery  S/p Lap Band (2013)      History of colonoscopy  (2007)      S/P endoscopy  (2008)          ANTIMICROBIAL:  piperacillin/tazobactam IVPB.. 3.375 Gram(s) IV Intermittent every 8 hours    CARDIOVASCULAR:  losartan 100 milliGRAM(s) Oral daily    PULMONARY:    NEUROLOGIC:  acetaminophen     Tablet .. 650 milliGRAM(s) Oral every 6 hours PRN  melatonin 5 milliGRAM(s) Oral at bedtime PRN  morphine  - Injectable 4 milliGRAM(s) IV Push every 4 hours PRN  morphine  - Injectable 2 milliGRAM(s) IV Push every 4 hours PRN  oxyCODONE    IR 5 milliGRAM(s) Oral every 4 hours PRN    ONCOLOGIC:    HEMATOLOGIC:  heparin   Injectable 5000 Unit(s) SubCutaneous every 8 hours    GATROINTESTINAL:  pantoprazole  Injectable 40 milliGRAM(s) IV Push daily  senna 2 Tablet(s) Oral at bedtime     MEDS:    ENDO/METABOLIC:    IV FLUID/NUTRITION:  lactated ringers. 1000 milliLiter(s) IV Continuous <Continuous>    TOPICAL:    IMMUNOLOGIC & OTHER        Allergies    No Known Allergies    Intolerances      PAST MEDICAL & SURGICAL HISTORY:  DJD (Degenerative Joint Disease)  s/p back surgery      Hypertension      Hyperlipemia      GERD (Gastroesophageal Reflux Disease)      Depression      Anxiety      Carpal Tunnel Syndrome  Bilateral hands (2013)      Cataract  Bilateral eyes (2010)      Rectocele  (2007)      S/P lumbar fusion  L4-L5 lumbar fusion (2008)      Elective surgery  S/p Lap Band (2013)      History of colonoscopy  (2007)      S/P endoscopy  (2008)        SOCIAL HISTORY:    FAMILY HISTORY:  CAD (coronary artery disease) (Father)    Diabetes mellitus (Father)    Family history of dementia (Father)    Family history of esophageal cancer (Mother)        Vital Signs Last 24 Hrs  T(C): 36.8 (08 Jul 2023 08:14), Max: 37 (08 Jul 2023 00:25)  T(F): 98.2 (08 Jul 2023 08:14), Max: 98.6 (08 Jul 2023 00:25)  HR: 80 (08 Jul 2023 08:14) (61 - 80)  BP: 166/92 (08 Jul 2023 08:14) (150/84 - 191/82)  BP(mean): --  RR: 16 (08 Jul 2023 08:14) (16 - 16)  SpO2: 90% (08 Jul 2023 08:14) (90% - 97%)    Parameters below as of 08 Jul 2023 08:14  Patient On (Oxygen Delivery Method): room air          I&O's Detail    Daily Height in cm: 149.86 (07 Jul 2023 18:52)    Daily     REVIEW OF SYSTEMS:    as per hpi, other systems reviewed and are negative      PHYSICAL EXAM:    GENERAL: NAD, older female  HEAD:  Atraumatic, Normocephalic  EYES: EOMI, PERRLA, conjunctiva and sclera clear  ENMT: No tonsillar erythema, exudate ; Moist mucous membrane   NECK: Supple, No JVD   NERVOUS SYSTEM:  Alert & Oriented X3, Good concentration; no focal deficits  CHEST/LUNG: Clear to percussion bilaterally; No rales, rhonchi, wheezing, or rubs  HEART: Regular rate and rhythm; No murmurs, rubs, or gallops  ABDOMEN: Soft, obese, + RUQ tenderness with mild guarding, no rebound noted, + BS  EXTREMITIES:  2+ Peripheral Pulses, No clubbing, cyanosis, or edema  LYMPH: No lymphadenopathy   SKIN: No rashes or lesions      LABS:                        13.5   10.77 )-----------( 242      ( 08 Jul 2023 06:31 )             39.1     07-08    135  |  94<L>  |  10  ----------------------------<  154<H>  3.5   |  32<H>  |  0.93    Ca    9.0      08 Jul 2023 10:15    TPro  7.6  /  Alb  3.8  /  TBili  3.7<H>  /  DBili  x   /  AST  563<H>  /  ALT  378<H>  /  AlkPhos  212<H>  07-08    PT/INR - ( 07 Jul 2023 19:47 )   PT: 12.1 sec;   INR: 1.05 ratio         PTT - ( 07 Jul 2023 19:47 )  PTT:30.9 sec  Urinalysis Basic - ( 08 Jul 2023 10:15 )    Color: x / Appearance: x / SG: x / pH: x  Gluc: 154 mg/dL / Ketone: x  / Bili: x / Urobili: x   Blood: x / Protein: x / Nitrite: x   Leuk Esterase: x / RBC: x / WBC x   Sq Epi: x / Non Sq Epi: x / Bacteria: x          PT/INR - ( 07 Jul 2023 19:47 )   PT: 12.1 sec;   INR: 1.05 ratio         PTT - ( 07 Jul 2023 19:47 )  PTT:30.9 sec    RADIOLOGY & ADDITIONAL STUDIES:    < from: CT Abdomen and Pelvis w/ IV Cont (07.07.23 @ 21:38) >  IMPRESSION:  Thickened gallbladder wall with cholelithiasis and biliary ductal   dilatation, suggestive of acute cholecystitis in theappropriate clinical   setting.    Markedly thickened gastric fundus with focal outpouching concerning for   gastritis and/or peptic ulcer disease. Lap-band apparatus in appropriate   position.    Degenerative changes of the lumbar spine status post posterior lumbar   fusion from L4 to S1, with endplate irregularity at L3-L4. Findings   likely represent chronic degenerative changes, although other etiologies   are not entirely excluded and MRI lumbar spine is advised if further   characterizationis warranted.    < end of copied text >  < from: US Abdomen Upper Quadrant Right (07.08.23 @ 00:18) >  IMPRESSION:  Cholelithiasis, without convincing sonographic evidence of acute   cholecystitis.    < end of copied text >  
68 yo fem h/0 lap gastric banding 20 years ago, has been followed with Dr Sawyer in the past,  who presented with RUQ abdominal pain after eating a turkey burger 24 hours ago. Her abd pain is 9/10, not relieved by anything. Ct Abd showed acute cholecystitis. WBC 11,000. Normal LFts    "date of service"23 @ 00:20    HPI:      PAST MEDICAL & SURGICAL HISTORY:  DJD (Degenerative Joint Disease)  s/p back surgery      Hypertension      Hyperlipemia      GERD (Gastroesophageal Reflux Disease)      Depression      Anxiety      Carpal Tunnel Syndrome  Bilateral hands ()      Cataract  Bilateral eyes ()      Rectocele  ()      S/P lumbar fusion  L4-L5 lumbar fusion ()      Elective surgery  S/p Lap Band ()      History of colonoscopy  ()      S/P endoscopy  ()          23 @ 00:20  REVIEW OF SYSTEMS:    CONSTITUTIONAL: No weakness, fevers or chills  EYES/ENT: No visual changes;  No vertigo or throat pain   NECK: No pain or stiffness  RESPIRATORY: No cough, wheezing, hemoptysis; No shortness of breath  CARDIOVASCULAR: No chest pain or palpitations  GASTROINTESTINAL:  abdominal  epigastric pain.  nausea, no vomiting, or hematemesis; No diarrhea or constipation. No melena or hematochezia.  GENITOURINARY: No dysuria, frequency or hematuria  NEUROLOGICAL: No numbness or weakness  SKIN: No itching, burning, rashes, or lesions   All other review of systems is negative unless indicated above.    MEDICATIONS  (STANDING):    MEDICATIONS  (PRN):      Allergies    No Known Allergies    Intolerances        SOCIAL HISTORY: non smoker    FAMILY HISTORY:  CAD (coronary artery disease) (Father)    Diabetes mellitus (Father)    Family history of dementia (Father)    Family history of esophageal cancer (Mother)        Vital Signs Last 24 Hrs  T(C): 36.5 (2023 18:52), Max: 36.5 (2023 18:52)  T(F): 97.7 (2023 18:52), Max: 97.7 (2023 18:52)  HR: 61 (2023 18:52) (61 - 61)  BP: 191/82 (2023 18:52) (191/82 - 191/82)  BP(mean): --  RR: 16 (2023 18:52) (16 - 16)  SpO2: 97% (2023 18:52) (97% - 97%)    Parameters below as of 2023 18:52  Patient On (Oxygen Delivery Method): room air        .23 @ 00:20  VITAL SIGNS:  T(C): 36.5 (23 @ 18:52), Max: 36.5 (23 @ 18:52)  T(F): 97.7 (23 @ 18:52), Max: 97.7 (23 @ 18:52)  HR: 61 (23 @ 18:52) (61 - 61)  BP: 191/82 (23 @ 18:52) (191/82 - 191/82)  BP(mean): --  RR: 16 (23 @ 18:52) (16 - 16)  SpO2: 97% (23 @ 18:52) (97% - 97%)  Wt(kg): --    PHYSICAL EXAM:    Constitutional: resting comfortably in bed; NAD  Eyes: PERRL, EOMI, anicteric sclera  ENT: no nasal discharge; uvula midline, no oropharyngeal erythema or exudates  Neck: supple; no JVD or thyromegaly  Respiratory: CTA B/L; no W/R/R, no retractions  Cardiac: +S1/S2; RRR; no murmurs  Gastrointestinal: soft, RUQ tenderness, lap band port LUQ  Back: spine midline, no bony tenderness or step-offs; no CVAT B/L, scar  Extremities: no clubbing or cyanosis; no peripheral edema  Musculoskeletal: NROM x4; no joint swelling, tenderness or erythema  Vascular: 2+ radial, femoral, DP/PT pulses B/L  Dermatologic: skin warm, dry and intact; no rashes, wounds, or scars  Lymphatic: no submandibular or cervical LAD  Neurologic: AAOx3; CNII-XII grossly intact; no focal deficits  Psychiatric: affect and characteristics of appearance, verbalizations, behaviors are appropriate    LABS:                        13.7   11.77 )-----------( 205      ( 2023 19:47 )             39.2           134<L>  |  95<L>  |  11  ----------------------------<  109<H>  3.4<L>   |  28  |  0.63    Ca    8.6      2023 20:52    TPro  7.7  /  Alb  3.5  /  TBili  0.8  /  DBili  x   /  AST  30  /  ALT  24  /  AlkPhos  58        PT/INR - ( 2023 19:47 )   PT: 12.1 sec;   INR: 1.05 ratio         PTT - ( 2023 19:47 )  PTT:30.9 sec    Urinalysis Basic - ( 2023 21:53 )    Color: Yellow / Appearance: Clear / S.024 / pH: x  Gluc: x / Ketone: 15 mg/dL  / Bili: Negative / Urobili: 0.2 mg/dL   Blood: x / Protein: Negative mg/dL / Nitrite: Negative   Leuk Esterase: Negative / RBC: 0 /HPF / WBC 2 /HPF   Sq Epi: x / Non Sq Epi: x / Bacteria: x        RADIOLOGY & ADDITIONAL STUDIES:  < from: CT Abdomen and Pelvis w/ IV Cont (23 @ 21:38) >  IMPRESSION:  Thickened gallbladder wall with cholelithiasis and biliary ductal   dilatation, suggestive of acute cholecystitis in theappropriate clinical   setting.    Markedly thickened gastric fundus with focal outpouching concerning for   gastritis and/or peptic ulcer disease. Lap-band apparatus in appropriate   position.    Degenerative changes of the lumbar spine status post posterior lumbar   fusion from L4 to S1, with endplate irregularity at L3-L4. Findings   likely represent chronic degenerative changes, although other etiologies   are not entirely excluded and MRI lumbar spine is advised if further   characterizationis warranted.    < end of copied text >

## 2023-07-09 NOTE — PATIENT PROFILE ADULT - FALL HARM RISK - FALLEN IN PAST
Medical Necessity Information: It is in your best interest to select a reason for this procedure from the list below. All of these items fulfill various CMS LCD requirements except the new and changing color options. Include Z78.9 (Other Specified Conditions Influencing Health Status) As An Associated Diagnosis?: No Post-Care Instructions: I reviewed with the patient in detail post-care instructions. Patient is to avoid picking at any of the treated lesions. Pt may apply Vaseline to crusted or scabbing areas. Render Post-Care Instructions In Note?: yes Detail Level: Simple Consent: The patient's consent was obtained including but not limited to risks of crusting, scabbing, blistering, scarring, darker or lighter pigmentary change, recurrence, incomplete removal and infection. Duration Of Freeze Thaw-Cycle (Seconds): 0 Medical Necessity Clause: This procedure was medically necessary because the lesions that were treated were: No

## 2023-07-09 NOTE — CONSULT NOTE ADULT - ASSESSMENT
acute [facility]  elevated lfts  cholangitis    revert to NPO  will need urgent ercp today given rapid rise in bilirubin  suspect impending cholangitis  will need lap band deflatted; d/w dr hsieh  procedure booked for today    risks/benefits/alternatives including but not limited to bleeding, infection, perforation or injury requiring surgery all discussed  also chance of causing post ercp pancreatitis (upwards of 5-10%) discussed  chance of needing biliary or pancreatic stent also discussed which would necessitate future procedures also discussed  patient understands and agrees to proceed with procedure    d/w patient and family bedside  d/w son over phone

## 2023-07-09 NOTE — PROGRESS NOTE ADULT - ASSESSMENT
Pain improving, but not resolved.  Vitals now notable for low grade temperature elevation to 100.  Abdomen with some tenderness most focal to epigastrium/ periumbilical region.  No lashon RUQ tenderness or Nielsen's sign/ peritoneal findings.  Labs now with leukocytosis and marked elevation of bilirubin, though some improvement in LFTs.  Sono more suggestive of cholecystitis.  However, CT with CBD of 11mm and intrahepatic dilation.  Lap Band aspirated of 2 cc of saline personally under sterile conditions to facilitate ERCP today.  Further management pending ERCP findings and response to intervention.  Appreciate Hospitalist and GI assistance. 
68 yo female, pmh of htn, hld, gerd, lap band, depression/anxiety, presenting with acute cholecystitis and likely choledocholithaisis  - noted elevated LFTs, bili trending up  - admitted to surgery  - npo for emergent ERCP today  - continue zosyn  - follow up cultures      HTN  - hold hctz, BP mildly elevated, continue losartan  - continue lopressor      HLD - statin held due to transaminitis    gerd - protonix

## 2023-07-10 DIAGNOSIS — Z98.82 BREAST IMPLANT STATUS: Chronic | ICD-10-CM

## 2023-07-10 LAB
ALBUMIN SERPL ELPH-MCNC: 2.4 G/DL — LOW (ref 3.3–5)
ALP SERPL-CCNC: 171 U/L — HIGH (ref 40–120)
ALT FLD-CCNC: 141 U/L — HIGH (ref 12–78)
ANION GAP SERPL CALC-SCNC: 4 MMOL/L — LOW (ref 5–17)
AST SERPL-CCNC: 62 U/L — HIGH (ref 15–37)
BASOPHILS # BLD AUTO: 0.03 K/UL — SIGNIFICANT CHANGE UP (ref 0–0.2)
BASOPHILS NFR BLD AUTO: 0.2 % — SIGNIFICANT CHANGE UP (ref 0–2)
BILIRUB SERPL-MCNC: 4.6 MG/DL — HIGH (ref 0.2–1.2)
BUN SERPL-MCNC: 8 MG/DL — SIGNIFICANT CHANGE UP (ref 7–23)
CALCIUM SERPL-MCNC: 8.6 MG/DL — SIGNIFICANT CHANGE UP (ref 8.5–10.1)
CHLORIDE SERPL-SCNC: 110 MMOL/L — HIGH (ref 96–108)
CHOLEST SERPL-MCNC: 133 MG/DL — SIGNIFICANT CHANGE UP
CO2 SERPL-SCNC: 28 MMOL/L — SIGNIFICANT CHANGE UP (ref 22–31)
CREAT SERPL-MCNC: 0.7 MG/DL — SIGNIFICANT CHANGE UP (ref 0.5–1.3)
EGFR: 95 ML/MIN/1.73M2 — SIGNIFICANT CHANGE UP
EOSINOPHIL # BLD AUTO: 0 K/UL — SIGNIFICANT CHANGE UP (ref 0–0.5)
EOSINOPHIL NFR BLD AUTO: 0 % — SIGNIFICANT CHANGE UP (ref 0–6)
GLUCOSE SERPL-MCNC: 151 MG/DL — HIGH (ref 70–99)
HCT VFR BLD CALC: 32.8 % — LOW (ref 34.5–45)
HDLC SERPL-MCNC: 48 MG/DL — LOW
HGB BLD-MCNC: 11.4 G/DL — LOW (ref 11.5–15.5)
IMM GRANULOCYTES NFR BLD AUTO: 0.6 % — SIGNIFICANT CHANGE UP (ref 0–0.9)
INR BLD: 1.34 RATIO — HIGH (ref 0.88–1.16)
LIPID PNL WITH DIRECT LDL SERPL: 69 MG/DL — SIGNIFICANT CHANGE UP
LYMPHOCYTES # BLD AUTO: 0.74 K/UL — LOW (ref 1–3.3)
LYMPHOCYTES # BLD AUTO: 5.1 % — LOW (ref 13–44)
MCHC RBC-ENTMCNC: 30.5 PG — SIGNIFICANT CHANGE UP (ref 27–34)
MCHC RBC-ENTMCNC: 34.8 GM/DL — SIGNIFICANT CHANGE UP (ref 32–36)
MCV RBC AUTO: 87.7 FL — SIGNIFICANT CHANGE UP (ref 80–100)
MONOCYTES # BLD AUTO: 0.43 K/UL — SIGNIFICANT CHANGE UP (ref 0–0.9)
MONOCYTES NFR BLD AUTO: 2.9 % — SIGNIFICANT CHANGE UP (ref 2–14)
NEUTROPHILS # BLD AUTO: 13.32 K/UL — HIGH (ref 1.8–7.4)
NEUTROPHILS NFR BLD AUTO: 91.2 % — HIGH (ref 43–77)
NON HDL CHOLESTEROL: 85 MG/DL — SIGNIFICANT CHANGE UP
PLATELET # BLD AUTO: 201 K/UL — SIGNIFICANT CHANGE UP (ref 150–400)
POTASSIUM SERPL-MCNC: 3.3 MMOL/L — LOW (ref 3.5–5.3)
POTASSIUM SERPL-SCNC: 3.3 MMOL/L — LOW (ref 3.5–5.3)
PROT SERPL-MCNC: 6.6 GM/DL — SIGNIFICANT CHANGE UP (ref 6–8.3)
PROTHROM AB SERPL-ACNC: 15.6 SEC — HIGH (ref 10.5–13.4)
RBC # BLD: 3.74 M/UL — LOW (ref 3.8–5.2)
RBC # FLD: 13.1 % — SIGNIFICANT CHANGE UP (ref 10.3–14.5)
SARS-COV-2 RNA SPEC QL NAA+PROBE: SIGNIFICANT CHANGE UP
SODIUM SERPL-SCNC: 142 MMOL/L — SIGNIFICANT CHANGE UP (ref 135–145)
TRIGL SERPL-MCNC: 83 MG/DL — SIGNIFICANT CHANGE UP
WBC # BLD: 14.61 K/UL — HIGH (ref 3.8–10.5)
WBC # FLD AUTO: 14.61 K/UL — HIGH (ref 3.8–10.5)

## 2023-07-10 PROCEDURE — 99497 ADVNCD CARE PLAN 30 MIN: CPT | Mod: 25

## 2023-07-10 PROCEDURE — 43259 EGD US EXAM DUODENUM/JEJUNUM: CPT

## 2023-07-10 PROCEDURE — 43264 ERCP REMOVE DUCT CALCULI: CPT

## 2023-07-10 PROCEDURE — 99222 1ST HOSP IP/OBS MODERATE 55: CPT | Mod: 25

## 2023-07-10 PROCEDURE — 99223 1ST HOSP IP/OBS HIGH 75: CPT

## 2023-07-10 PROCEDURE — 43274 ERCP DUCT STENT PLACEMENT: CPT

## 2023-07-10 PROCEDURE — 12345: CPT | Mod: NC

## 2023-07-10 RX ORDER — ALPRAZOLAM 0.25 MG
2 TABLET ORAL THREE TIMES A DAY
Refills: 0 | Status: DISCONTINUED | OUTPATIENT
Start: 2023-07-10 | End: 2023-07-14

## 2023-07-10 RX ORDER — POLYETHYLENE GLYCOL 3350 17 G/17G
17 POWDER, FOR SOLUTION ORAL DAILY
Refills: 0 | Status: DISCONTINUED | OUTPATIENT
Start: 2023-07-10 | End: 2023-07-14

## 2023-07-10 RX ORDER — ACETAMINOPHEN 500 MG
650 TABLET ORAL EVERY 6 HOURS
Refills: 0 | Status: DISCONTINUED | OUTPATIENT
Start: 2023-07-10 | End: 2023-07-14

## 2023-07-10 RX ORDER — POTASSIUM CHLORIDE 20 MEQ
40 PACKET (EA) ORAL ONCE
Refills: 0 | Status: COMPLETED | OUTPATIENT
Start: 2023-07-10 | End: 2023-07-10

## 2023-07-10 RX ORDER — LOSARTAN POTASSIUM 100 MG/1
100 TABLET, FILM COATED ORAL DAILY
Refills: 0 | Status: DISCONTINUED | OUTPATIENT
Start: 2023-07-10 | End: 2023-07-14

## 2023-07-10 RX ORDER — LANOLIN ALCOHOL/MO/W.PET/CERES
3 CREAM (GRAM) TOPICAL AT BEDTIME
Refills: 0 | Status: DISCONTINUED | OUTPATIENT
Start: 2023-07-10 | End: 2023-07-14

## 2023-07-10 RX ORDER — SODIUM CHLORIDE 9 MG/ML
1000 INJECTION INTRAMUSCULAR; INTRAVENOUS; SUBCUTANEOUS
Refills: 0 | Status: DISCONTINUED | OUTPATIENT
Start: 2023-07-10 | End: 2023-07-13

## 2023-07-10 RX ORDER — NALOXONE HYDROCHLORIDE 4 MG/.1ML
0.4 SPRAY NASAL ONCE
Refills: 0 | Status: DISCONTINUED | OUTPATIENT
Start: 2023-07-10 | End: 2023-07-14

## 2023-07-10 RX ORDER — ONDANSETRON 8 MG/1
4 TABLET, FILM COATED ORAL ONCE
Refills: 0 | Status: DISCONTINUED | OUTPATIENT
Start: 2023-07-10 | End: 2023-07-10

## 2023-07-10 RX ORDER — OXYCODONE HYDROCHLORIDE 5 MG/1
5 TABLET ORAL ONCE
Refills: 0 | Status: DISCONTINUED | OUTPATIENT
Start: 2023-07-10 | End: 2023-07-10

## 2023-07-10 RX ORDER — FENTANYL CITRATE 50 UG/ML
50 INJECTION INTRAVENOUS
Refills: 0 | Status: DISCONTINUED | OUTPATIENT
Start: 2023-07-10 | End: 2023-07-10

## 2023-07-10 RX ORDER — VORTIOXETINE 5 MG/1
30 TABLET, FILM COATED ORAL DAILY
Refills: 0 | Status: DISCONTINUED | OUTPATIENT
Start: 2023-07-10 | End: 2023-07-14

## 2023-07-10 RX ORDER — SENNA PLUS 8.6 MG/1
2 TABLET ORAL AT BEDTIME
Refills: 0 | Status: DISCONTINUED | OUTPATIENT
Start: 2023-07-10 | End: 2023-07-14

## 2023-07-10 RX ORDER — MORPHINE SULFATE 50 MG/1
4 CAPSULE, EXTENDED RELEASE ORAL EVERY 4 HOURS
Refills: 0 | Status: DISCONTINUED | OUTPATIENT
Start: 2023-07-10 | End: 2023-07-13

## 2023-07-10 RX ORDER — PIPERACILLIN AND TAZOBACTAM 4; .5 G/20ML; G/20ML
3.38 INJECTION, POWDER, LYOPHILIZED, FOR SOLUTION INTRAVENOUS EVERY 8 HOURS
Refills: 0 | Status: DISCONTINUED | OUTPATIENT
Start: 2023-07-10 | End: 2023-07-13

## 2023-07-10 RX ORDER — ATORVASTATIN CALCIUM 80 MG/1
10 TABLET, FILM COATED ORAL AT BEDTIME
Refills: 0 | Status: DISCONTINUED | OUTPATIENT
Start: 2023-07-10 | End: 2023-07-14

## 2023-07-10 RX ORDER — SODIUM CHLORIDE 9 MG/ML
1000 INJECTION, SOLUTION INTRAVENOUS ONCE
Refills: 0 | Status: COMPLETED | OUTPATIENT
Start: 2023-07-10 | End: 2023-07-10

## 2023-07-10 RX ORDER — LIDOCAINE 4 G/100G
1 CREAM TOPICAL DAILY
Refills: 0 | Status: DISCONTINUED | OUTPATIENT
Start: 2023-07-10 | End: 2023-07-13

## 2023-07-10 RX ORDER — ONDANSETRON 8 MG/1
4 TABLET, FILM COATED ORAL EVERY 8 HOURS
Refills: 0 | Status: DISCONTINUED | OUTPATIENT
Start: 2023-07-10 | End: 2023-07-14

## 2023-07-10 RX ORDER — MORPHINE SULFATE 50 MG/1
2 CAPSULE, EXTENDED RELEASE ORAL EVERY 4 HOURS
Refills: 0 | Status: DISCONTINUED | OUTPATIENT
Start: 2023-07-10 | End: 2023-07-13

## 2023-07-10 RX ORDER — SODIUM CHLORIDE 9 MG/ML
1000 INJECTION, SOLUTION INTRAVENOUS
Refills: 0 | Status: DISCONTINUED | OUTPATIENT
Start: 2023-07-10 | End: 2023-07-10

## 2023-07-10 RX ORDER — METOPROLOL TARTRATE 50 MG
25 TABLET ORAL
Refills: 0 | Status: DISCONTINUED | OUTPATIENT
Start: 2023-07-10 | End: 2023-07-14

## 2023-07-10 RX ORDER — PANTOPRAZOLE SODIUM 20 MG/1
40 TABLET, DELAYED RELEASE ORAL
Refills: 0 | Status: DISCONTINUED | OUTPATIENT
Start: 2023-07-10 | End: 2023-07-14

## 2023-07-10 RX ADMIN — Medication 40 MILLIEQUIVALENT(S): at 06:28

## 2023-07-10 RX ADMIN — LIDOCAINE 1 PATCH: 4 CREAM TOPICAL at 20:26

## 2023-07-10 RX ADMIN — PIPERACILLIN AND TAZOBACTAM 25 GRAM(S): 4; .5 INJECTION, POWDER, LYOPHILIZED, FOR SOLUTION INTRAVENOUS at 22:15

## 2023-07-10 RX ADMIN — Medication 25 MILLIGRAM(S): at 09:59

## 2023-07-10 RX ADMIN — SENNA PLUS 2 TABLET(S): 8.6 TABLET ORAL at 22:15

## 2023-07-10 RX ADMIN — ATORVASTATIN CALCIUM 10 MILLIGRAM(S): 80 TABLET, FILM COATED ORAL at 22:29

## 2023-07-10 RX ADMIN — SODIUM CHLORIDE 100 MILLILITER(S): 9 INJECTION INTRAMUSCULAR; INTRAVENOUS; SUBCUTANEOUS at 15:53

## 2023-07-10 RX ADMIN — PIPERACILLIN AND TAZOBACTAM 25 GRAM(S): 4; .5 INJECTION, POWDER, LYOPHILIZED, FOR SOLUTION INTRAVENOUS at 06:28

## 2023-07-10 RX ADMIN — SODIUM CHLORIDE 1000 MILLILITER(S): 9 INJECTION, SOLUTION INTRAVENOUS at 15:30

## 2023-07-10 RX ADMIN — LIDOCAINE 1 PATCH: 4 CREAM TOPICAL at 10:03

## 2023-07-10 RX ADMIN — LIDOCAINE 1 PATCH: 4 CREAM TOPICAL at 22:29

## 2023-07-10 RX ADMIN — VORTIOXETINE 30 MILLIGRAM(S): 5 TABLET, FILM COATED ORAL at 10:00

## 2023-07-10 RX ADMIN — Medication 2 MILLIGRAM(S): at 22:13

## 2023-07-10 RX ADMIN — Medication 25 MILLIGRAM(S): at 22:15

## 2023-07-10 RX ADMIN — SODIUM CHLORIDE 1000 MILLILITER(S): 9 INJECTION, SOLUTION INTRAVENOUS at 14:30

## 2023-07-10 RX ADMIN — Medication 2 MILLIGRAM(S): at 10:06

## 2023-07-10 RX ADMIN — PANTOPRAZOLE SODIUM 40 MILLIGRAM(S): 20 TABLET, DELAYED RELEASE ORAL at 06:28

## 2023-07-10 RX ADMIN — Medication 3 MILLIGRAM(S): at 01:14

## 2023-07-10 RX ADMIN — LOSARTAN POTASSIUM 100 MILLIGRAM(S): 100 TABLET, FILM COATED ORAL at 09:59

## 2023-07-10 RX ADMIN — PIPERACILLIN AND TAZOBACTAM 25 GRAM(S): 4; .5 INJECTION, POWDER, LYOPHILIZED, FOR SOLUTION INTRAVENOUS at 15:45

## 2023-07-10 NOTE — PHYSICAL THERAPY INITIAL EVALUATION ADULT - GENERAL OBSERVATIONS, REHAB EVAL
Pt seen on 7/11, s/p ERCP. Pt Anticipating surgical intervention tomorrow. The pt agreed to get OOB and ambulate with PT.

## 2023-07-10 NOTE — H&P ADULT - ASSESSMENT
66 y/o F presented for abdominal pain     1. Abdominal pain, transaminitis, hyperbilirubinemia secondary to dilated CBD likely choledocoliathiasis, probable cholangitis, acute cholecystitis   - Admit to med/surg   - Total bilirubin 4.6, AST 61, , alkaline phosphatase 171   - c/w maintenance IVF overnight   - WBC 14.61 -> trend WBC, monitor for temperatures   - Tylenol for temperatures PRN   - Zofran for nausea   - NPO; advance diet as per GI   - ERCP in AM   - c/w Zosyn   - Pain control: morphine, tylenol PRN   - GI consult -  [facility]   - Surgery consult - Dr. Werner     2. Normocytic anemia  - Hb 11.3, monitor     3. Hypokalemia   - K+ 3.3, repleted f/u AM     4. Hyperglycemia   - Glucose 151, monitor   - Ordered HbA1c     5. Hypertension   - c/w home anti-HTN   - Pain control     6. History of HTN, HLD, DJD, GERD, anxiety/depression, HIRA not on CPAP (diagnosed 10 years ago), surgery for rectocele, s/p carpal tunnel surgery, s/p back surgery, s/p b/l breast augmentation and tummy tuck, s/p lap band   - c/w home medications; verified with pt the bedside  - SpO2 90-98% on 2L -> recommend outpt sleep study (pt not on CPAP outpt)   - No back pain upon my evaluation -> recommend Lumbar MRI outpt     DVT ppx: SCDs   Code status: Full code   Emergency contact: Mitchel Burgess (son) [phone]     I spent a total of 80 minutes on the date of this encounter coordinating the patient's care. This includes reviewing prior documentation, results and imaging in addition to completing a full history and physical examination on the patient. Further tests, medications, and procedures have been ordered as indicated. Laboratory results and the plan of care were communicated to the patient and/or their family member. Supporting documentation was completed and added to the patient's chart.

## 2023-07-10 NOTE — CONSULT NOTE ADULT - SUBJECTIVE AND OBJECTIVE BOX
Patient is a 67y old  Female who presents with a chief complaint of abdominal pain    HPI:  This is a 67 year old female with significant past medical history of HTN, HLD, DJD, GERD, anxiety/depression, HIRA not on CPAP, gastric lap band, presenting initially to [facility] with abdominal pain. As per H&P from [facility] pt had a large meal at 8:30 prior to admission and 5 hours later woke up with severe epigastric pain and nausea. She took stool softeners gas x and xanax without relief. The pain was described as sharp and constant with radiation to the right.     Vitals from 7/8/23: /83, SpO2 90-98% on room air. Labs: K+ 3.4, glucose 109, WBC 11.77, LFTs were initially normal upon admission. CT abd/pelvis: thickened gallbladder wall with cholelithiasis and biliary ductal dilation, suggestive of acute cholecystitis in the appropriate clinical setting. Markedly thickened gastric fundus with focal outpouching concerning for gastritis or PUD. Lap bad in appropriate position    Pt was admitted to [facility]. Given Zosyn, IVF, and pain medications. She was seen by gastroenterology for increasing LFTs and probable cholangitis. She was started on Zosyn. Pt had a 5F by 4 cm pancreatic stent placed, with failed biliary cannulation. Transferred to  for EUS/ ERCP with Dr. Lee.     At bedside this morning, patient jaundiced endorsing feeling anxious, "did not sleep at all," but denying pain currently. Does report relief s/p ERCP at [facility]. Has not been medicated for pain since procedure, denies necessity for pain meds currently.       PAST MEDICAL & SURGICAL HISTORY:  DJD (Degenerative Joint Disease)  s/p back surgery      Hypertension      Hyperlipemia      GERD (Gastroesophageal Reflux Disease)      Depression      Anxiety      HIRA (obstructive sleep apnea)      Carpal Tunnel Syndrome  Bilateral hands (2013)      Cataract  Bilateral eyes (2010)      Rectocele  (2007)      S/P lumbar fusion  L4-L5 lumbar fusion (2008)      Elective surgery  S/p Lap Band (2013)      History of colonoscopy  (2007)      S/P endoscopy  (2008)      H/O breast augmentation    MEDICATIONS  (STANDING):  atorvastatin 10 milliGRAM(s) Oral at bedtime  hydrochlorothiazide 25 milliGRAM(s) Oral daily  lidocaine   4% Patch 1 Patch Transdermal daily  losartan 100 milliGRAM(s) Oral daily  metoprolol succinate ER 25 milliGRAM(s) Oral two times a day  naloxone Injectable 0.4 milliGRAM(s) IV Push once  pantoprazole    Tablet 40 milliGRAM(s) Oral before breakfast  piperacillin/tazobactam IVPB.. 3.375 Gram(s) IV Intermittent every 8 hours  polyethylene glycol 3350 17 Gram(s) Oral daily  senna 2 Tablet(s) Oral at bedtime  sodium chloride 0.9%. 1000 milliLiter(s) (100 mL/Hr) IV Continuous <Continuous>  vortioxetine 30 milliGRAM(s) Oral daily    MEDICATIONS  (PRN):  acetaminophen     Tablet .. 650 milliGRAM(s) Oral every 6 hours PRN Temp greater or equal to 38C (100.4F), Mild Pain (1 - 3)  ALPRAZolam 2 milliGRAM(s) Oral three times a day PRN for anxiety  aluminum hydroxide/magnesium hydroxide/simethicone Suspension 30 milliLiter(s) Oral every 4 hours PRN Dyspepsia  bisacodyl 5 milliGRAM(s) Oral daily PRN Constipation  melatonin 3 milliGRAM(s) Oral at bedtime PRN Insomnia  morphine  - Injectable 2 milliGRAM(s) IV Push every 4 hours PRN Moderate Pain (4 - 6)  morphine  - Injectable 4 milliGRAM(s) IV Push every 4 hours PRN Severe Pain (7 - 10)  ondansetron Injectable 4 milliGRAM(s) IV Push every 8 hours PRN Nausea and/or Vomiting    Allergies    No Known Allergies    Intolerances    SOCIAL HISTORY:    FAMILY HISTORY:  CAD (coronary artery disease) (Father)    Diabetes mellitus (Father)    Family history of dementia (Father)    Family history of esophageal cancer (Mother)    REVIEW OF SYSTEMS:    CONSTITUTIONAL: No weakness, fevers or chills  EYES/ENT: No visual changes;  No vertigo or throat pain   NECK: No pain or stiffness  RESPIRATORY: No cough, wheezing, hemoptysis; No shortness of breath  CARDIOVASCULAR: No chest pain or palpitations  GASTROINTESTINAL: See HPI  GENITOURINARY: No dysuria, frequency or hematuria  NEUROLOGICAL: No numbness or weakness  SKIN: No itching, burning, rashes, or lesions   PSYCH: Normal mood and affect  All other review of systems is negative unless indicated above.    Vital Signs Last 24 Hrs  T(C): 36.5 (10 Jul 2023 07:47), Max: 37.4 (09 Jul 2023 17:10)  T(F): 97.7 (10 Jul 2023 07:47), Max: 99.4 (09 Jul 2023 17:10)  HR: 79 (10 Jul 2023 07:47) (79 - 99)  BP: 128/71 (10 Jul 2023 07:47) (111/61 - 138/71)  BP(mean): --  RR: 16 (10 Jul 2023 07:47) (15 - 26)  SpO2: 95% (10 Jul 2023 07:47) (92% - 99%)    Parameters below as of 10 Jul 2023 07:47  Patient On (Oxygen Delivery Method): room air    PHYSICAL EXAM:    Constitutional: No acute distress, well-developed, non-toxic appearing  HEENT: good phonation, not icteric  Neck: supple, no lymphadenopathy  Respiratory: clear to ascultation bilaterally, no wheezing  Cardiovascular: S1 and S2, regular rate and rhythm, no murmurs rubs or gallops  Gastrointestinal: soft, mildly tender, protuberant due to body habitus, +bowel sounds, no rebound or guarding, no surgical scars, no drains  Extremities: No peripheral edema, no cyanosis or clubbing  Vascular: 2+ peripheral pulses, no venous stasis  Neurological: A/O x 3, no focal deficits, no asterixis  Psychiatric: Normal mood, normal affect  Skin: No rashes, jaundiced    LABS:                        11.4   14.61 )-----------( 201      ( 10 Jul 2023 01:24 )             32.8     07-10    142  |  110<H>  |  8   ----------------------------<  151<H>  3.3<L>   |  28  |  0.70    Ca    8.6      10 Jul 2023 01:24  Mg     1.5     07-09    TPro  6.6  /  Alb  2.4<L>  /  TBili  4.6<H>  /  DBili  x   /  AST  62<H>  /  ALT  141<H>  /  AlkPhos  171<H>  07-10    PT/INR - ( 10 Jul 2023 01:24 )   PT: 15.6 sec;   INR: 1.34 ratio           LIVER FUNCTIONS - ( 10 Jul 2023 01:24 )  Alb: 2.4 g/dL / Pro: 6.6 gm/dL / ALK PHOS: 171 U/L / ALT: 141 U/L / AST: 62 U/L / GGT: x             RADIOLOGY & ADDITIONAL STUDIES:  IMPRESSION:  Thickened gallbladder wall with cholelithiasis and biliary ductal   dilatation, suggestive of acute cholecystitis in the appropriate clinical   setting.    Markedly thickened gastric fundus with focal outpouching concerning for   gastritis and/or peptic ulcer disease. Lap-band apparatus in appropriate   position.    Degenerative changes of the lumbar spine status post posterior lumbar   fusion from L4 to S1, with endplate irregularity at L3-L4. Findings   likely represent chronic degenerative changes, although other etiologies   are not entirely excluded and MRI lumbar spine is advised if further   characterization is warranted. Patient is a 67y old  Female who presents with a chief complaint of abdominal pain    HPI:  This is a 67 year old female with significant past medical history of HTN, HLD, DJD, GERD, anxiety/depression, HIRA not on CPAP, gastric lap band, presenting initially to [facility] with abdominal pain.     Patient states that after dinner this past saturday night had severe abdominal pain. Pain is sharp, epigastric, stabbing, constant, non radiating, sudden onset, without known alleviating factors. No fevers or chills. No vomiting. No overt signs of gi bleeding like hematemesis, melena, hematochezia. Does endorse jaundice. Was admitted to [facility], found to be jaundiced, and had attempted ERCP there. Failed cannulation and placed a PD stent. Was transferred here for advanced endoscopic intervnetion.       PAST MEDICAL & SURGICAL HISTORY:  DJD (Degenerative Joint Disease)  s/p back surgery      Hypertension      Hyperlipemia      GERD (Gastroesophageal Reflux Disease)      Depression      Anxiety      HIRA (obstructive sleep apnea)      Carpal Tunnel Syndrome  Bilateral hands (2013)      Cataract  Bilateral eyes (2010)      Rectocele  (2007)      S/P lumbar fusion  L4-L5 lumbar fusion (2008)      Elective surgery  S/p Lap Band (2013)      History of colonoscopy  (2007)      S/P endoscopy  (2008)      H/O breast augmentation    MEDICATIONS  (STANDING):  atorvastatin 10 milliGRAM(s) Oral at bedtime  hydrochlorothiazide 25 milliGRAM(s) Oral daily  lidocaine   4% Patch 1 Patch Transdermal daily  losartan 100 milliGRAM(s) Oral daily  metoprolol succinate ER 25 milliGRAM(s) Oral two times a day  naloxone Injectable 0.4 milliGRAM(s) IV Push once  pantoprazole    Tablet 40 milliGRAM(s) Oral before breakfast  piperacillin/tazobactam IVPB.. 3.375 Gram(s) IV Intermittent every 8 hours  polyethylene glycol 3350 17 Gram(s) Oral daily  senna 2 Tablet(s) Oral at bedtime  sodium chloride 0.9%. 1000 milliLiter(s) (100 mL/Hr) IV Continuous <Continuous>  vortioxetine 30 milliGRAM(s) Oral daily    MEDICATIONS  (PRN):  acetaminophen     Tablet .. 650 milliGRAM(s) Oral every 6 hours PRN Temp greater or equal to 38C (100.4F), Mild Pain (1 - 3)  ALPRAZolam 2 milliGRAM(s) Oral three times a day PRN for anxiety  aluminum hydroxide/magnesium hydroxide/simethicone Suspension 30 milliLiter(s) Oral every 4 hours PRN Dyspepsia  bisacodyl 5 milliGRAM(s) Oral daily PRN Constipation  melatonin 3 milliGRAM(s) Oral at bedtime PRN Insomnia  morphine  - Injectable 2 milliGRAM(s) IV Push every 4 hours PRN Moderate Pain (4 - 6)  morphine  - Injectable 4 milliGRAM(s) IV Push every 4 hours PRN Severe Pain (7 - 10)  ondansetron Injectable 4 milliGRAM(s) IV Push every 8 hours PRN Nausea and/or Vomiting    Allergies    No Known Allergies    Intolerances    SOCIAL HISTORY:   no smoking, drinking or drugs    FAMILY HISTORY:  CAD (coronary artery disease) (Father)    Diabetes mellitus (Father)    Family history of dementia (Father)    Family history of esophageal cancer (Mother)    REVIEW OF SYSTEMS:    CONSTITUTIONAL: No weakness, fevers or chills  EYES/ENT: No visual changes;  No vertigo or throat pain   NECK: No pain or stiffness  RESPIRATORY: No cough, wheezing, hemoptysis; No shortness of breath  CARDIOVASCULAR: No chest pain or palpitations  GASTROINTESTINAL: See HPI  GENITOURINARY: No dysuria, frequency or hematuria  NEUROLOGICAL: No numbness or weakness  SKIN: No itching, burning, rashes, or lesions   PSYCH: Normal mood and affect  All other review of systems is negative unless indicated above.    Vital Signs Last 24 Hrs  T(C): 36.5 (10 Jul 2023 07:47), Max: 37.4 (09 Jul 2023 17:10)  T(F): 97.7 (10 Jul 2023 07:47), Max: 99.4 (09 Jul 2023 17:10)  HR: 79 (10 Jul 2023 07:47) (79 - 99)  BP: 128/71 (10 Jul 2023 07:47) (111/61 - 138/71)  BP(mean): --  RR: 16 (10 Jul 2023 07:47) (15 - 26)  SpO2: 95% (10 Jul 2023 07:47) (92% - 99%)    Parameters below as of 10 Jul 2023 07:47  Patient On (Oxygen Delivery Method): room air    PHYSICAL EXAM:    Constitutional: No acute distress, well-developed, non-toxic appearing  HEENT: good phonation, + icteric  Neck: supple, no lymphadenopathy  Respiratory: clear to ascultation bilaterally, no wheezing  Cardiovascular: S1 and S2, regular rate and rhythm, no murmurs rubs or gallops  Gastrointestinal: soft, mildly tender, protuberant due to body habitus, +bowel sounds, no rebound or guarding, + surgical scars, no drains  Extremities: No peripheral edema, no cyanosis or clubbing  Vascular: 2+ peripheral pulses, no venous stasis  Neurological: A/O x 3, no focal deficits, no asterixis  Psychiatric: Normal mood, normal affect  Skin: No rashes, +jaundiced    LABS:                        11.4   14.61 )-----------( 201      ( 10 Jul 2023 01:24 )             32.8     07-10    142  |  110<H>  |  8   ----------------------------<  151<H>  3.3<L>   |  28  |  0.70    Ca    8.6      10 Jul 2023 01:24  Mg     1.5     07-09    TPro  6.6  /  Alb  2.4<L>  /  TBili  4.6<H>  /  DBili  x   /  AST  62<H>  /  ALT  141<H>  /  AlkPhos  171<H>  07-10    PT/INR - ( 10 Jul 2023 01:24 )   PT: 15.6 sec;   INR: 1.34 ratio           LIVER FUNCTIONS - ( 10 Jul 2023 01:24 )  Alb: 2.4 g/dL / Pro: 6.6 gm/dL / ALK PHOS: 171 U/L / ALT: 141 U/L / AST: 62 U/L / GGT: x             RADIOLOGY & ADDITIONAL STUDIES:  IMPRESSION:  Thickened gallbladder wall with cholelithiasis and biliary ductal   dilatation, suggestive of acute cholecystitis in the appropriate clinical   setting.    Markedly thickened gastric fundus with focal outpouching concerning for   gastritis and/or peptic ulcer disease. Lap-band apparatus in appropriate   position.    Degenerative changes of the lumbar spine status post posterior lumbar   fusion from L4 to S1, with endplate irregularity at L3-L4. Findings   likely represent chronic degenerative changes, although other etiologies   are not entirely excluded and MRI lumbar spine is advised if further   characterization is warranted. Patient is a 67y old  Female who presents with a chief complaint of abdominal pain    HPI:  This is a 67 year old female with significant past medical history of HTN, HLD, DJD, GERD, anxiety/depression, HIRA not on CPAP, gastric lap band, presenting initially to [facility] with abdominal pain.     Patient states that after dinner this past saturday night had severe abdominal pain. Pain is sharp, epigastric, stabbing, constant, non radiating, sudden onset, without known alleviating factors. Pain 6/10. No fevers or chills. No vomiting. No overt signs of gi bleeding like hematemesis, melena, hematochezia. Does endorse jaundice. Was admitted to [facility], found to be jaundiced, and had attempted ERCP there. Failed cannulation and placed a PD stent. Was transferred here for advanced endoscopic intervnetion.       PAST MEDICAL & SURGICAL HISTORY:  DJD (Degenerative Joint Disease)  s/p back surgery      Hypertension      Hyperlipemia      GERD (Gastroesophageal Reflux Disease)      Depression      Anxiety      HIRA (obstructive sleep apnea)      Carpal Tunnel Syndrome  Bilateral hands (2013)      Cataract  Bilateral eyes (2010)      Rectocele  (2007)      S/P lumbar fusion  L4-L5 lumbar fusion (2008)      Elective surgery  S/p Lap Band (2013)      History of colonoscopy  (2007)      S/P endoscopy  (2008)      H/O breast augmentation    MEDICATIONS  (STANDING):  atorvastatin 10 milliGRAM(s) Oral at bedtime  hydrochlorothiazide 25 milliGRAM(s) Oral daily  lidocaine   4% Patch 1 Patch Transdermal daily  losartan 100 milliGRAM(s) Oral daily  metoprolol succinate ER 25 milliGRAM(s) Oral two times a day  naloxone Injectable 0.4 milliGRAM(s) IV Push once  pantoprazole    Tablet 40 milliGRAM(s) Oral before breakfast  piperacillin/tazobactam IVPB.. 3.375 Gram(s) IV Intermittent every 8 hours  polyethylene glycol 3350 17 Gram(s) Oral daily  senna 2 Tablet(s) Oral at bedtime  sodium chloride 0.9%. 1000 milliLiter(s) (100 mL/Hr) IV Continuous <Continuous>  vortioxetine 30 milliGRAM(s) Oral daily    MEDICATIONS  (PRN):  acetaminophen     Tablet .. 650 milliGRAM(s) Oral every 6 hours PRN Temp greater or equal to 38C (100.4F), Mild Pain (1 - 3)  ALPRAZolam 2 milliGRAM(s) Oral three times a day PRN for anxiety  aluminum hydroxide/magnesium hydroxide/simethicone Suspension 30 milliLiter(s) Oral every 4 hours PRN Dyspepsia  bisacodyl 5 milliGRAM(s) Oral daily PRN Constipation  melatonin 3 milliGRAM(s) Oral at bedtime PRN Insomnia  morphine  - Injectable 2 milliGRAM(s) IV Push every 4 hours PRN Moderate Pain (4 - 6)  morphine  - Injectable 4 milliGRAM(s) IV Push every 4 hours PRN Severe Pain (7 - 10)  ondansetron Injectable 4 milliGRAM(s) IV Push every 8 hours PRN Nausea and/or Vomiting    Allergies    No Known Allergies    Intolerances    SOCIAL HISTORY:   no smoking, drinking or drugs    FAMILY HISTORY:  CAD (coronary artery disease) (Father)    Diabetes mellitus (Father)    Family history of dementia (Father)    Family history of esophageal cancer (Mother)    REVIEW OF SYSTEMS:    CONSTITUTIONAL: No weakness, fevers or chills  EYES/ENT: No visual changes;  No vertigo or throat pain   NECK: No pain or stiffness  RESPIRATORY: No cough, wheezing, hemoptysis; No shortness of breath  CARDIOVASCULAR: No chest pain or palpitations  GASTROINTESTINAL: See HPI  GENITOURINARY: No dysuria, frequency or hematuria  NEUROLOGICAL: No numbness or weakness  SKIN: No itching, burning, rashes, or lesions   PSYCH: Normal mood and affect  All other review of systems is negative unless indicated above.    Vital Signs Last 24 Hrs  T(C): 36.5 (10 Jul 2023 07:47), Max: 37.4 (09 Jul 2023 17:10)  T(F): 97.7 (10 Jul 2023 07:47), Max: 99.4 (09 Jul 2023 17:10)  HR: 79 (10 Jul 2023 07:47) (79 - 99)  BP: 128/71 (10 Jul 2023 07:47) (111/61 - 138/71)  BP(mean): --  RR: 16 (10 Jul 2023 07:47) (15 - 26)  SpO2: 95% (10 Jul 2023 07:47) (92% - 99%)    Parameters below as of 10 Jul 2023 07:47  Patient On (Oxygen Delivery Method): room air    PHYSICAL EXAM:    Constitutional: No acute distress, well-developed, non-toxic appearing  HEENT: good phonation, + icteric  Neck: supple, no lymphadenopathy  Respiratory: clear to ascultation bilaterally, no wheezing  Cardiovascular: S1 and S2, regular rate and rhythm, no murmurs rubs or gallops  Gastrointestinal: soft, mildly tender, protuberant due to body habitus, +bowel sounds, no rebound or guarding, + surgical scars, no drains  Extremities: No peripheral edema, no cyanosis or clubbing  Vascular: 2+ peripheral pulses, no venous stasis  Neurological: A/O x 3, no focal deficits, no asterixis  Psychiatric: Normal mood, normal affect  Skin: No rashes, +jaundiced    LABS:                        11.4   14.61 )-----------( 201      ( 10 Jul 2023 01:24 )             32.8     07-10    142  |  110<H>  |  8   ----------------------------<  151<H>  3.3<L>   |  28  |  0.70    Ca    8.6      10 Jul 2023 01:24  Mg     1.5     07-09    TPro  6.6  /  Alb  2.4<L>  /  TBili  4.6<H>  /  DBili  x   /  AST  62<H>  /  ALT  141<H>  /  AlkPhos  171<H>  07-10    PT/INR - ( 10 Jul 2023 01:24 )   PT: 15.6 sec;   INR: 1.34 ratio           LIVER FUNCTIONS - ( 10 Jul 2023 01:24 )  Alb: 2.4 g/dL / Pro: 6.6 gm/dL / ALK PHOS: 171 U/L / ALT: 141 U/L / AST: 62 U/L / GGT: x             RADIOLOGY & ADDITIONAL STUDIES:  IMPRESSION:  Thickened gallbladder wall with cholelithiasis and biliary ductal   dilatation, suggestive of acute cholecystitis in the appropriate clinical   setting.    Markedly thickened gastric fundus with focal outpouching concerning for   gastritis and/or peptic ulcer disease. Lap-band apparatus in appropriate   position.    Degenerative changes of the lumbar spine status post posterior lumbar   fusion from L4 to S1, with endplate irregularity at L3-L4. Findings   likely represent chronic degenerative changes, although other etiologies   are not entirely excluded and MRI lumbar spine is advised if further   characterization is warranted.

## 2023-07-10 NOTE — H&P ADULT - HISTORY OF PRESENT ILLNESS
66 y/o F with PMH HTN, HLD, DJD, GERD, anxiety/depression, HIRA not on CPAP (diagnosed 10 years ago), surgery for rectocele, s/p carpal tunnel surgery, s/p back surgery, s/p b/l breast augmentation and tummy tuck, s/p lap band presented to [facility] with abdominal pain. As per H&P from [place] pt had a large meal at 8:30 prior to admission and 5 hours later woke up with severe epigastric pain and nausea. She took stool softeners gas x and xanax without relief. The pain was described as sharp and constant with radiation to the right.     Vitals from 7/8/23: /83, SpO2 90-98% on room air. Labs: K+ 3.4, glucose 109, WBC 11.77, LFTs were initially normal upon admission. CT abd/pelvis: thickened gallbladder wall with cholelithiasis and biliary ductal dilation, suggestive of acute cholecystitis in the appropriate clinical setting. Markedly thickened gastric fundus with focal outpouching concerning for gastritis or PUD. Lap bad in appropriate position. Degenerative changes of lumbar spine, s/p lumbar fusion L4 to S1. MRI lumbar spine advised if further characterization is warranted.     Pt was admitted to [place]. Given Zosyn, IVF, and pain medications. She was seen by gastroenterology for increasing LFTs and probable cholangitis. She was started on Zosyn. Pt had a 5F by 4 cm pancreatic stent placed. However, she was transferred to  for ERCP by Dr. Lee.     Upon my evaluation she does not have any pain. She has been passing gas, no bowel movement in a few days. Denies headaches, blurry vision, dizziness, fevers, chills, chest pain, SOB, abdominal pain, N/V, diarrhea/constipation.      At , VSS. Labs: WBC 14.61, Hb 11.4, INR 1.34, K+ 3.3, glucose 151, albumin 2.4, total bilirubin 4.6, alkaline phosphatase 171, AST 62, . She was admitted to med/surg and placed NPO for ERCP tomorrow.     Pt was evaluated by Dr. Estrada; however, she wanted Dr. Sawyer who performed her lap band to be completed by him.

## 2023-07-10 NOTE — PHYSICAL THERAPY INITIAL EVALUATION ADULT - PERTINENT HX OF CURRENT PROBLEM, REHAB EVAL
68 y/o F with PMH HTN, HLD, DJD, GERD, anxiety/depression, HIRA not on CPAP (diagnosed 10 years ago), surgery for rectocele, s/p carpal tunnel surgery, s/p back surgery, s/p b/l breast augmentation and tummy tuck, s/p lap band presented to [facility] with abdominal pain. As per H&P from [place] pt had a large meal at 8:30 prior to admission and 5 hours later woke up with severe epigastric pain and nausea. She took stool softeners gas x and xanax without relief. The pain was described as sharp and constant with radiation to the right

## 2023-07-10 NOTE — H&P ADULT - NSGCTIMESPENTATTENDAPP_GEN_A_CORE
17 Paramedian Forehead Flap Text: A decision was made to reconstruct the defect utilizing an interpolation axial flap and a staged reconstruction.  A telfa template was made of the defect.  This telfa template was then used to outline the paramedian forehead pedicle flap.  The donor area for the pedicle flap was then injected with anesthesia.  The flap was excised through the skin and subcutaneous tissue down to the layer of the underlying musculature.  The pedicle flap was carefully excised within this deep plane to maintain its blood supply.  The edges of the donor site were undermined.   The donor site was closed in a primary fashion.  The pedicle was then rotated into position and sutured.  Once the tube was sutured into place, adequate blood supply was confirmed with blanching and refill.  The pedicle was then wrapped with xeroform gauze and dressed appropriately with a telfa and gauze bandage to ensure continued blood supply and protect the attached pedicle.

## 2023-07-10 NOTE — H&P ADULT - NSHPREVIEWOFSYSTEMS_GEN_ALL_CORE
Constitutional: negative for fatigue, negative for fever, negative for chills, negative for decreased appetite.  Skin: negative for rashes, negative for open wounds, negative for jaundice.   Eyes: negative for blurry vision, negative for double vision.   Ears, nose, throat: negative for ear pain, negative for nasal congestion, negative for sore throat, negative for lymph node swelling.   Cardiovascular: negative for chest pain, negative for palpitations, negative for lower extremity swelling.   Respiratory: negative for shortness of breath, negative for wheezing, negative for cough.   Gastrointestinal: negative for abdominal pain, negative for nausea, negative for vomiting, negative for diarrhea, negative for constipation, negative for blood in the stool, negative for black tarry stools.   Genitourinary: negative for burning on urination, negative for urinary urgency or frequency, negative for blood in the urine.   Endocrine: negative for cold intolerance, negative for heat intolerance, negative for increased thirst.   Hematologic: negative for easy bruising or bleeding.   Musculoskeletal: negative for muscle/joint pain, negative for decreased range of motion.   Neurological: negative for dizziness, negative for headaches, negative for loss of consciousness, negative for motor weakness, negative for sensory deficits.   Psychiatric: negative for depression, negative for anxiety.

## 2023-07-10 NOTE — H&P ADULT - NSHPPHYSICALEXAM_GEN_ALL_CORE
ICU Vital Signs Last 24 Hrs  T(C): 37.2 (09 Jul 2023 23:04), Max: 37.4 (09 Jul 2023 17:10)  T(F): 99 (09 Jul 2023 23:04), Max: 99.4 (09 Jul 2023 17:10)  HR: 91 (09 Jul 2023 23:04) (88 - 99)  BP: 129/62 (09 Jul 2023 23:04) (111/61 - 138/71)  RR: 18 (09 Jul 2023 23:04) (15 - 26)  SpO2: 95% (09 Jul 2023 23:04) (92% - 100%)    O2 Parameters below as of 09 Jul 2023 23:04  Patient On (Oxygen Delivery Method): nasal cannula  O2 Flow (L/min): 2    General: Awake and alert, cooperative with exam. No acute distress.   Skin: Warm, dry, and pink.   Eyes: Pupils equal and reactive to light. Extraocular eye movements intact. No conjunctival injection, discharge, or scleral icterus.   HEENT: Atraumatic, normocephalic. Moist mucus membranes.  Cardiology: Normal S1, S2. No murmurs, rubs, or gallops. Regular rate and rhythm.   Respiratory: Lungs clear to ascultation bilaterally. Good air exchange. No wheezes, rales, or rhonchi. Normal chest expansion.   Gastrointestinal: Positive bowel sounds. Soft, non-distended. No guarding, rigidity, or rebound tenderness. No hepatosplenomegaly. + tenderness diffusely. + lackey's.   Musculoskeletal: 5/5 motor strength in all extremities. Normal range of motion.   Extremities: No peripheral edema bilaterally. Dorsalis pedis pulses 2+ bilaterally.   Neurological: A+Ox3 (person, place, and time). Cranial nerves 2-12 intact. Normal speech. No facial droop. No focal neurological deficits. 5/5 motor strength in all extremities.   Psychiatric: Normal affect. Normal mood.

## 2023-07-10 NOTE — PROVIDER CONTACT NOTE (OTHER) - SITUATION
Consult for Jesus called in and message left with answering service                                                        Consult for Alphonso called in and message left to be returned in am.

## 2023-07-10 NOTE — CONSULT NOTE ADULT - ASSESSMENT
71 year old female with acute abdominal pain, s/p ercp [place] with PD stent placement with failed biliary cannulation requiring transfer for ERCP     Rec:  ::EUS/ERCP today  ::Maintain NPO  ::IVF prn  ::Pain control and antiemetics
A/P:  66 yo F s/p ERCP and sphincterotomy/Stone/sludge removal and stent placement 7/10  wbc 14  INR 1.34  K 3.3  Tbili 4.6 from 6.2<8  Dbili 7.3    P;  cont CLD  trend LFts  Planning for RA possible Lap aric 7/12  pain control  monitor v/s  f/u GI reccs  Please provide medical risk assessment for the procedure   rest of management as per primary team    Plan d/w Dr. Sosa

## 2023-07-10 NOTE — PHYSICAL THERAPY INITIAL EVALUATION ADULT - CRITERIA FOR SKILLED THERAPEUTIC INTERVENTIONS
Home, f/u outpatient PT PRN, otherwise no need for continued skilled acute care PT services at present. The pt should transfer/ ambulate under nursing supervision PRN,/anticipated discharge recommendation

## 2023-07-10 NOTE — CHART NOTE - NSCHARTNOTEFT_GEN_A_CORE
Patient is seen and examined at bedside. Chart reviewed. Family at bedside  Istop reviewed- last script for xanax 2mg #90 tabs filled out on 07/01/23. Last script for adderral 15mg BID #60 tabs filled out on 6/13/23  S/p ERCP stent placement  Cont IV zosyn  For lap aric on Wednesday

## 2023-07-10 NOTE — CONSULT NOTE ADULT - SUBJECTIVE AND OBJECTIVE BOX
Surgery Consultation  68 yo F with PMH of  HLD, HTN, DJD, GERD, anxiety, depression, Lap band placed 10 years ago at [facility] loss 10 lbs, bands is deflated presented to [place] on Friday with abdominal pain associated with nausea. Pt was found to have cholecystitis an found to have choledocholithiasis and underwent ERCP at [place] which was not successful and transferred to  for further treatment. Pt was seen at bedside in PACU. Pt underwent ERCP and sphincterotomy/Stone/sludge removal and stent placement by Dr. Lee. Pt doing well, reports still has RUQ abdominal pain its much better. Denies fever or chills, n/v/d/c      PAST MEDICAL & SURGICAL HISTORY:  DJD (Degenerative Joint Disease)  s/p back surgery      Hypertension      Hyperlipemia      GERD (Gastroesophageal Reflux Disease)      Depression      Anxiety      HIRA (obstructive sleep apnea)      Carpal Tunnel Syndrome  Bilateral hands (2013)      Cataract  Bilateral eyes (2010)      Rectocele  (2007)      S/P lumbar fusion  L4-L5 lumbar fusion (2008)      Elective surgery  S/p Lap Band (2013)      History of colonoscopy  (2007)      S/P endoscopy  (2008)      H/O breast augmentation        Allergies:  No Known Allergies    Home Medications:  atorvastatin 10 mg oral tablet:  orally once a day  dextroamphetamine-amphetamine 15 mg oral capsule, extended release: 1 tab(s) orally 2 times a day  losartan-hydroCHLOROthiazide 100 mg-25 mg oral tablet: 1 tab(s) orally once a day  metoprolol succinate 25 mg oral tablet, extended release:  orally 2 times a day  omeprazole 40 mg oral delayed release capsule: 1 tab(s) orally once a day  Trintellix 10 mg oral tablet: 3 tab(s) orally once a day  Xanax 2 mg oral tablet: 1 tab(s) orally 3 times a day as needed for  anxiety      Family History:  FAMILY HISTORY:  CAD (coronary artery disease) (Father)    Diabetes mellitus (Father)    Family history of dementia (Father)    Family history of esophageal cancer (Mother)        ROS:  Constitutional: Denies fever, fatigue or weight loss.  Skin: Denies rash.  Eyes: Denies recent vision problems or eye pain.  ENT: Denies congestion, ear pain, or sore throat.  Endocrine: Denies thyroid problems.  Cardiovascular: Denies chest pain or palpation.  Respiratory: Denies cough, shortness of breath, congestion, or wheezing.  Gastrointestinal: +RUQ abdominal pain/nausea, denies vomiting, or diarrhea.  Genitourinary: Denies dysuria.  Musculoskeletal: Denies joint swelling.  Neurologic: Denies headache.      Physical Examination:  AO x3, NAD  GENERAL: No acute distress, well-developed  HEAD:  Atraumatic, Normocephalic  CHEST/LUNG: CTAB; No wheezes, rales, or rhonchi  HEART: Regular rate and rhythm; No murmurs, rubs, or gallops  ABDOMEN: Soft, mild RUQ tenderness, left side port site in place  Ext. no cyanosis or edema  NEUROLOGY: responding appropriately, no focal deficits    Data:                        11.4   14.61 )-----------( 201      ( 10 Jul 2023 01:24 )             32.8     07-10    142  |  110<H>  |  8   ----------------------------<  151<H>  3.3<L>   |  28  |  0.70    Ca    8.6      10 Jul 2023 01:24  Mg     1.5     07-09    TPro  6.6  /  Alb  2.4<L>  /  TBili  4.6<H>  /  DBili  x   /  AST  62<H>  /  ALT  141<H>  /  AlkPhos  171<H>  07-10      LIVER FUNCTIONS - ( 10 Jul 2023 01:24 )  Alb: 2.4 g/dL / Pro: 6.6 gm/dL / ALK PHOS: 171 U/L / ALT: 141 U/L / AST: 62 U/L / GGT: x           Urinalysis Basic - ( 10 Jul 2023 01:24 )    Color: x / Appearance: x / SG: x / pH: x  Gluc: 151 mg/dL / Ketone: x  / Bili: x / Urobili: x   Blood: x / Protein: x / Nitrite: x   Leuk Esterase: x / RBC: x / WBC x   Sq Epi: x / Non Sq Epi: x / Bacteria: x        Radiology:    CT a/p: Thickened gallbladder wall with cholelithiasis and biliary ductal   dilatation, suggestive of acute cholecystitis in the appropriate clinical   setting.    Markedly thickened gastric fundus with focal outpouching concerning for   gastritis and/or peptic ulcer disease. Lap-band apparatus in appropriate   position.    Degenerative changes of the lumbar spine status post posterior lumbar   fusion from L4 to S1, with endplate irregularity at L3-L4. Findings   likely represent chronic degenerative changes, although other etiologies   are not entirely excluded and MRI lumbar spine is advised if further   characterization is warranted.

## 2023-07-10 NOTE — H&P ADULT - NSICDXPASTMEDICALHX_GEN_ALL_CORE_FT
PAST MEDICAL HISTORY:  Anxiety     Depression     DJD (Degenerative Joint Disease) s/p back surgery    GERD (Gastroesophageal Reflux Disease)     Hyperlipemia     Hypertension     HIRA (obstructive sleep apnea)

## 2023-07-10 NOTE — H&P ADULT - NSICDXPASTSURGICALHX_GEN_ALL_CORE_FT
PAST SURGICAL HISTORY:  Carpal Tunnel Syndrome Bilateral hands (2013)    Cataract Bilateral eyes (2010)    Elective surgery S/p Lap Band (2013)    H/O breast augmentation     History of colonoscopy (2007)    Rectocele (2007)    S/P endoscopy (2008)    S/P lumbar fusion L4-L5 lumbar fusion (2008)

## 2023-07-11 DIAGNOSIS — K80.51 CALCULUS OF BILE DUCT WITHOUT CHOLANGITIS OR CHOLECYSTITIS WITH OBSTRUCTION: ICD-10-CM

## 2023-07-11 LAB
ALBUMIN SERPL ELPH-MCNC: 2.3 G/DL — LOW (ref 3.3–5)
ALP SERPL-CCNC: 138 U/L — HIGH (ref 40–120)
ALT FLD-CCNC: 101 U/L — HIGH (ref 12–78)
ANION GAP SERPL CALC-SCNC: 5 MMOL/L — SIGNIFICANT CHANGE UP (ref 5–17)
AST SERPL-CCNC: 39 U/L — HIGH (ref 15–37)
BASOPHILS # BLD AUTO: 0.01 K/UL — SIGNIFICANT CHANGE UP (ref 0–0.2)
BASOPHILS NFR BLD AUTO: 0.1 % — SIGNIFICANT CHANGE UP (ref 0–2)
BILIRUB SERPL-MCNC: 1.9 MG/DL — HIGH (ref 0.2–1.2)
BUN SERPL-MCNC: 15 MG/DL — SIGNIFICANT CHANGE UP (ref 7–23)
CALCIUM SERPL-MCNC: 8.2 MG/DL — LOW (ref 8.5–10.1)
CHLORIDE SERPL-SCNC: 111 MMOL/L — HIGH (ref 96–108)
CO2 SERPL-SCNC: 24 MMOL/L — SIGNIFICANT CHANGE UP (ref 22–31)
CREAT SERPL-MCNC: 0.71 MG/DL — SIGNIFICANT CHANGE UP (ref 0.5–1.3)
EGFR: 93 ML/MIN/1.73M2 — SIGNIFICANT CHANGE UP
EOSINOPHIL # BLD AUTO: 0 K/UL — SIGNIFICANT CHANGE UP (ref 0–0.5)
EOSINOPHIL NFR BLD AUTO: 0 % — SIGNIFICANT CHANGE UP (ref 0–6)
GLUCOSE SERPL-MCNC: 132 MG/DL — HIGH (ref 70–99)
HCT VFR BLD CALC: 31.5 % — LOW (ref 34.5–45)
HGB BLD-MCNC: 10.3 G/DL — LOW (ref 11.5–15.5)
IMM GRANULOCYTES NFR BLD AUTO: 0.5 % — SIGNIFICANT CHANGE UP (ref 0–0.9)
LYMPHOCYTES # BLD AUTO: 1.63 K/UL — SIGNIFICANT CHANGE UP (ref 1–3.3)
LYMPHOCYTES # BLD AUTO: 15.2 % — SIGNIFICANT CHANGE UP (ref 13–44)
MAGNESIUM SERPL-MCNC: 2.1 MG/DL — SIGNIFICANT CHANGE UP (ref 1.6–2.6)
MCHC RBC-ENTMCNC: 29.8 PG — SIGNIFICANT CHANGE UP (ref 27–34)
MCHC RBC-ENTMCNC: 32.7 GM/DL — SIGNIFICANT CHANGE UP (ref 32–36)
MCV RBC AUTO: 91 FL — SIGNIFICANT CHANGE UP (ref 80–100)
MONOCYTES # BLD AUTO: 0.56 K/UL — SIGNIFICANT CHANGE UP (ref 0–0.9)
MONOCYTES NFR BLD AUTO: 5.2 % — SIGNIFICANT CHANGE UP (ref 2–14)
NEUTROPHILS # BLD AUTO: 8.5 K/UL — HIGH (ref 1.8–7.4)
NEUTROPHILS NFR BLD AUTO: 79 % — HIGH (ref 43–77)
PHOSPHATE SERPL-MCNC: 2.3 MG/DL — LOW (ref 2.5–4.5)
PLATELET # BLD AUTO: 210 K/UL — SIGNIFICANT CHANGE UP (ref 150–400)
POTASSIUM SERPL-MCNC: 3.4 MMOL/L — LOW (ref 3.5–5.3)
POTASSIUM SERPL-SCNC: 3.4 MMOL/L — LOW (ref 3.5–5.3)
PROT SERPL-MCNC: 6.4 GM/DL — SIGNIFICANT CHANGE UP (ref 6–8.3)
RBC # BLD: 3.46 M/UL — LOW (ref 3.8–5.2)
RBC # FLD: 13.2 % — SIGNIFICANT CHANGE UP (ref 10.3–14.5)
SODIUM SERPL-SCNC: 140 MMOL/L — SIGNIFICANT CHANGE UP (ref 135–145)
WBC # BLD: 10.75 K/UL — HIGH (ref 3.8–10.5)
WBC # FLD AUTO: 10.75 K/UL — HIGH (ref 3.8–10.5)

## 2023-07-11 PROCEDURE — 99232 SBSQ HOSP IP/OBS MODERATE 35: CPT

## 2023-07-11 RX ORDER — POTASSIUM CHLORIDE 20 MEQ
40 PACKET (EA) ORAL EVERY 4 HOURS
Refills: 0 | Status: COMPLETED | OUTPATIENT
Start: 2023-07-11 | End: 2023-07-11

## 2023-07-11 RX ADMIN — MORPHINE SULFATE 2 MILLIGRAM(S): 50 CAPSULE, EXTENDED RELEASE ORAL at 08:00

## 2023-07-11 RX ADMIN — PANTOPRAZOLE SODIUM 40 MILLIGRAM(S): 20 TABLET, DELAYED RELEASE ORAL at 05:38

## 2023-07-11 RX ADMIN — MORPHINE SULFATE 2 MILLIGRAM(S): 50 CAPSULE, EXTENDED RELEASE ORAL at 08:15

## 2023-07-11 RX ADMIN — MORPHINE SULFATE 4 MILLIGRAM(S): 50 CAPSULE, EXTENDED RELEASE ORAL at 21:05

## 2023-07-11 RX ADMIN — PIPERACILLIN AND TAZOBACTAM 25 GRAM(S): 4; .5 INJECTION, POWDER, LYOPHILIZED, FOR SOLUTION INTRAVENOUS at 13:52

## 2023-07-11 RX ADMIN — MORPHINE SULFATE 4 MILLIGRAM(S): 50 CAPSULE, EXTENDED RELEASE ORAL at 21:20

## 2023-07-11 RX ADMIN — Medication 2 MILLIGRAM(S): at 23:13

## 2023-07-11 RX ADMIN — LIDOCAINE 1 PATCH: 4 CREAM TOPICAL at 10:21

## 2023-07-11 RX ADMIN — Medication 25 MILLIGRAM(S): at 10:19

## 2023-07-11 RX ADMIN — SENNA PLUS 2 TABLET(S): 8.6 TABLET ORAL at 21:04

## 2023-07-11 RX ADMIN — Medication 40 MILLIEQUIVALENT(S): at 10:18

## 2023-07-11 RX ADMIN — MORPHINE SULFATE 2 MILLIGRAM(S): 50 CAPSULE, EXTENDED RELEASE ORAL at 14:04

## 2023-07-11 RX ADMIN — LIDOCAINE 1 PATCH: 4 CREAM TOPICAL at 21:22

## 2023-07-11 RX ADMIN — SODIUM CHLORIDE 100 MILLILITER(S): 9 INJECTION INTRAMUSCULAR; INTRAVENOUS; SUBCUTANEOUS at 12:08

## 2023-07-11 RX ADMIN — VORTIOXETINE 30 MILLIGRAM(S): 5 TABLET, FILM COATED ORAL at 12:09

## 2023-07-11 RX ADMIN — Medication 2 MILLIGRAM(S): at 18:42

## 2023-07-11 RX ADMIN — MORPHINE SULFATE 2 MILLIGRAM(S): 50 CAPSULE, EXTENDED RELEASE ORAL at 13:51

## 2023-07-11 RX ADMIN — LOSARTAN POTASSIUM 100 MILLIGRAM(S): 100 TABLET, FILM COATED ORAL at 10:19

## 2023-07-11 RX ADMIN — Medication 40 MILLIEQUIVALENT(S): at 13:51

## 2023-07-11 RX ADMIN — Medication 2 MILLIGRAM(S): at 10:18

## 2023-07-11 RX ADMIN — PIPERACILLIN AND TAZOBACTAM 25 GRAM(S): 4; .5 INJECTION, POWDER, LYOPHILIZED, FOR SOLUTION INTRAVENOUS at 21:15

## 2023-07-11 RX ADMIN — Medication 25 MILLIGRAM(S): at 21:05

## 2023-07-11 RX ADMIN — LIDOCAINE 1 PATCH: 4 CREAM TOPICAL at 19:45

## 2023-07-11 RX ADMIN — POLYETHYLENE GLYCOL 3350 17 GRAM(S): 17 POWDER, FOR SOLUTION ORAL at 10:18

## 2023-07-11 RX ADMIN — ATORVASTATIN CALCIUM 10 MILLIGRAM(S): 80 TABLET, FILM COATED ORAL at 21:32

## 2023-07-11 RX ADMIN — PIPERACILLIN AND TAZOBACTAM 25 GRAM(S): 4; .5 INJECTION, POWDER, LYOPHILIZED, FOR SOLUTION INTRAVENOUS at 05:38

## 2023-07-11 NOTE — PROGRESS NOTE ADULT - ASSESSMENT
66 yo F s/p ERCP and sphincterotomy/stone/sludge removal and stent placement 7/10    P;  Continue CLD  Trend LFTs  Planning for RA possible lap possible open aric 7/12  Pain and nausea control PRN  Monitor v/s  F/u GI recs  Medical risk assessment for surgery  Rest of management as per primary team    TO be disscused with Dr. Sosa.

## 2023-07-11 NOTE — PROGRESS NOTE ADULT - SUBJECTIVE AND OBJECTIVE BOX
CC-  abd pain    HPI:  68 y/o F with PMH HTN, HLD, DJD, GERD, anxiety/depression, HIRA not on CPAP (diagnosed 10 years ago), surgery for rectocele, s/p carpal tunnel surgery, s/p back surgery, s/p b/l breast augmentation and tummy tuck, s/p lap band presented to [facility] with abdominal pain. As per H&P from [place] pt had a large meal at 8:30 prior to admission and 5 hours later woke up with severe epigastric pain and nausea. She took stool softeners gas x and xanax without relief. The pain was described as sharp and constant with radiation to the right.     Vitals from 7/8/23: /83, SpO2 90-98% on room air. Labs: K+ 3.4, glucose 109, WBC 11.77, LFTs were initially normal upon admission. CT abd/pelvis: thickened gallbladder wall with cholelithiasis and biliary ductal dilation, suggestive of acute cholecystitis in the appropriate clinical setting. Markedly thickened gastric fundus with focal outpouching concerning for gastritis or PUD. Lap bad in appropriate position. Degenerative changes of lumbar spine, s/p lumbar fusion L4 to S1. MRI lumbar spine advised if further characterization is warranted.     Pt was admitted to [place]. Given Zosyn, IVF, and pain medications. She was seen by gastroenterology for increasing LFTs and probable cholangitis. She was started on Zosyn. Pt had a 5F by 4 cm pancreatic stent placed. However, she was transferred to  for ERCP by Dr. Lee.     Upon my evaluation she does not have any pain. She has been passing gas, no bowel movement in a few days. Denies headaches, blurry vision, dizziness, fevers, chills, chest pain, SOB, abdominal pain, N/V, diarrhea/constipation.      At , VSS. Labs: WBC 14.61, Hb 11.4, INR 1.34, K+ 3.3, glucose 151, albumin 2.4, total bilirubin 4.6, alkaline phosphatase 171, AST 62, . She was admitted to med/surg and placed NPO for ERCP tomorrow.     Pt was evaluated by Dr. Estrada; however, she wanted Dr. Sawyer who performed her lap band to be completed by him.  (10 Jul 2023 03:39)    7/11/23- s/p ERCP and stent placement yesterday. No pain today. Ambulating    Review of system- All 10 systems reviewed and is as per HPI otherwise negative.     T(C): 36.6 (07-11-23 @ 07:45), Max: 36.9 (07-10-23 @ 22:07)  HR: 50 (07-11-23 @ 12:19) (50 - 80)  BP: 132/74 (07-11-23 @ 12:19) (132/74 - 170/80)  RR: 18 (07-11-23 @ 07:45) (16 - 21)  SpO2: 97% (07-11-23 @ 07:45) (92% - 97%)  Wt(kg): --    LABS:                        10.3   10.75 )-----------( 210      ( 11 Jul 2023 08:24 )             31.5     07-11    140  |  111<H>  |  15  ----------------------------<  132<H>  3.4<L>   |  24  |  0.71    Ca    8.2<L>      11 Jul 2023 08:24  Phos  2.3     07-11  Mg     2.1     07-11    TPro  6.4  /  Alb  2.3<L>  /  TBili  1.9<H>  /  DBili  x   /  AST  39<H>  /  ALT  101<H>  /  AlkPhos  138<H>  07-11    PT/INR - ( 10 Jul 2023 01:24 )   PT: 15.6 sec;   INR: 1.34 ratio      Urinalysis Basic - ( 11 Jul 2023 08:24 )  Color: x / Appearance: x / SG: x / pH: x  Gluc: 132 mg/dL / Ketone: x  / Bili: x / Urobili: x   Blood: x / Protein: x / Nitrite: x   Leuk Esterase: x / RBC: x / WBC x   Sq Epi: x / Non Sq Epi: x / Bacteria: x    RADIOLOGY & ADDITIONAL TESTS:      PHYSICAL EXAM:  GENERAL: NAD, well-groomed, well-developed  HEAD:  Atraumatic, Normocephalic  EYES: EOMI, PERRLA, conjunctiva and sclera clear  HEENT: Moist mucous membranes  NECK: Supple, No JVD  NERVOUS SYSTEM:  Alert & Oriented X3, Motor Strength 5/5 B/L upper and lower extremities; DTRs 2+ intact and symmetric  CHEST/LUNG: Clear to auscultation bilaterally; No rales, rhonchi, wheezing, or rubs  HEART: Regular rate and rhythm; No murmurs, rubs, or gallops  ABDOMEN: Soft, Nontender, Nondistended; Bowel sounds present  GENITOURINARY- Voiding, no palpable bladder  EXTREMITIES:  2+ Peripheral Pulses, No clubbing, cyanosis, or edema  MUSCULOSKELTAL- No muscle tenderness, Muscle tone normal, No joint tenderness, no Joint swelling, Joint range of motion-normal  SKIN-no rash, no lesion  CNS- alert, oriented X3, non focal     MEDICATIONS  (STANDING):  atorvastatin 10 milliGRAM(s) Oral at bedtime  hydrochlorothiazide 25 milliGRAM(s) Oral daily  lidocaine   4% Patch 1 Patch Transdermal daily  losartan 100 milliGRAM(s) Oral daily  metoprolol succinate ER 25 milliGRAM(s) Oral two times a day  naloxone Injectable 0.4 milliGRAM(s) IV Push once  pantoprazole    Tablet 40 milliGRAM(s) Oral before breakfast  piperacillin/tazobactam IVPB.. 3.375 Gram(s) IV Intermittent every 8 hours  polyethylene glycol 3350 17 Gram(s) Oral daily  potassium chloride    Tablet ER 40 milliEquivalent(s) Oral every 4 hours  senna 2 Tablet(s) Oral at bedtime  sodium chloride 0.9%. 1000 milliLiter(s) (100 mL/Hr) IV Continuous <Continuous>  vortioxetine 30 milliGRAM(s) Oral daily    MEDICATIONS  (PRN):  acetaminophen     Tablet .. 650 milliGRAM(s) Oral every 6 hours PRN Temp greater or equal to 38C (100.4F), Mild Pain (1 - 3)  ALPRAZolam 2 milliGRAM(s) Oral three times a day PRN for anxiety  aluminum hydroxide/magnesium hydroxide/simethicone Suspension 30 milliLiter(s) Oral every 4 hours PRN Dyspepsia  bisacodyl 5 milliGRAM(s) Oral daily PRN Constipation  melatonin 3 milliGRAM(s) Oral at bedtime PRN Insomnia  morphine  - Injectable 4 milliGRAM(s) IV Push every 4 hours PRN Severe Pain (7 - 10)  morphine  - Injectable 2 milliGRAM(s) IV Push every 4 hours PRN Moderate Pain (4 - 6)  ondansetron Injectable 4 milliGRAM(s) IV Push every 8 hours PRN Nausea and/or Vomiting    Assessment/Plan  #Acute gallstone cholecystitis with choledocholithiasis  GI following  S/p ERCP with sphincterectomy, stent placement and stones extraction  Surg team following  Cont IV Zosyn  LFT's are trending down  OR lap aric planned tomorrow  Patient is medically optimized for OR    #Hypokalemia- replete    #HTN/hyperlipidemia  Cont home meds    #MIld anemia likely dilutional  Monitor     #DVT pproph- ambulate, venodynes    #Dispo- OR tomorrow lap aric

## 2023-07-11 NOTE — PROGRESS NOTE ADULT - SUBJECTIVE AND OBJECTIVE BOX
Hospital  Day#: 2  Procedure:  ERCP with stent by Dr. Lee    The patient is doing well without complaints, Bilirubin trending down, afebrile. Discussed with patient and son - Mitchel in laymen's terms choledocholithiasis and risk & benefit's of cholecystectomy.     Vital Signs Last 24 Hrs  T(C): 36.6 (11 Jul 2023 07:45), Max: 36.9 (10 Jul 2023 22:07)  T(F): 97.9 (11 Jul 2023 07:45), Max: 98.5 (10 Jul 2023 22:07)  HR: 56 (11 Jul 2023 07:45) (52 - 80)  BP: 170/80 (11 Jul 2023 07:45) (135/58 - 170/80)  BP(mean): 93 (10 Jul 2023 22:07) (93 - 93)  RR: 18 (11 Jul 2023 07:45) (16 - 21)  SpO2: 97% (11 Jul 2023 07:45) (92% - 97%)    Parameters below as of 11 Jul 2023 07:45  Patient On (Oxygen Delivery Method): room air        PHYSICAL EXAM:  General: NAD.  HEENT: no JVD, less jaundice   LUNGS: CTAB.  Heart: S1 S2 RRR  Abd: soft nt/nd   Wounds: clean dry and intact                          10.3   10.75 )-----------( 210      ( 11 Jul 2023 08:24 )             31.5       07-11    140  |  111<H>  |  15  ----------------------------<  132<H>  3.4<L>   |  24  |  0.71    Ca    8.2<L>      11 Jul 2023 08:24  Phos  2.3     07-11  Mg     2.1     07-11    TPro  6.4  /  Alb  2.3<L>  /  TBili  1.9<H>  /  DBili  x   /  AST  39<H>  /  ALT  101<H>  /  AlkPhos  138<H>  07-11      PT/INR - ( 10 Jul 2023 01:24 )   PT: 15.6 sec;   INR: 1.34 ratio

## 2023-07-12 ENCOUNTER — RESULT REVIEW (OUTPATIENT)
Age: 68
End: 2023-07-12

## 2023-07-12 ENCOUNTER — TRANSCRIPTION ENCOUNTER (OUTPATIENT)
Age: 68
End: 2023-07-12

## 2023-07-12 DIAGNOSIS — K80.50 CALCULUS OF BILE DUCT WITHOUT CHOLANGITIS OR CHOLECYSTITIS WITHOUT OBSTRUCTION: ICD-10-CM

## 2023-07-12 LAB
ALBUMIN SERPL ELPH-MCNC: 2.4 G/DL — LOW (ref 3.3–5)
ALP SERPL-CCNC: 161 U/L — HIGH (ref 40–120)
ALT FLD-CCNC: 114 U/L — HIGH (ref 12–78)
ANION GAP SERPL CALC-SCNC: 4 MMOL/L — LOW (ref 5–17)
AST SERPL-CCNC: 73 U/L — HIGH (ref 15–37)
BILIRUB SERPL-MCNC: 1.8 MG/DL — HIGH (ref 0.2–1.2)
BUN SERPL-MCNC: 12 MG/DL — SIGNIFICANT CHANGE UP (ref 7–23)
CALCIUM SERPL-MCNC: 8.4 MG/DL — LOW (ref 8.5–10.1)
CHLORIDE SERPL-SCNC: 113 MMOL/L — HIGH (ref 96–108)
CO2 SERPL-SCNC: 25 MMOL/L — SIGNIFICANT CHANGE UP (ref 22–31)
CREAT SERPL-MCNC: 0.7 MG/DL — SIGNIFICANT CHANGE UP (ref 0.5–1.3)
EGFR: 95 ML/MIN/1.73M2 — SIGNIFICANT CHANGE UP
GLUCOSE SERPL-MCNC: 86 MG/DL — SIGNIFICANT CHANGE UP (ref 70–99)
HCT VFR BLD CALC: 34.2 % — LOW (ref 34.5–45)
HGB BLD-MCNC: 11.3 G/DL — LOW (ref 11.5–15.5)
INR BLD: 1.02 RATIO — SIGNIFICANT CHANGE UP (ref 0.88–1.16)
MAGNESIUM SERPL-MCNC: 1.8 MG/DL — SIGNIFICANT CHANGE UP (ref 1.6–2.6)
MCHC RBC-ENTMCNC: 29.9 PG — SIGNIFICANT CHANGE UP (ref 27–34)
MCHC RBC-ENTMCNC: 33 GM/DL — SIGNIFICANT CHANGE UP (ref 32–36)
MCV RBC AUTO: 90.5 FL — SIGNIFICANT CHANGE UP (ref 80–100)
PHOSPHATE SERPL-MCNC: 2 MG/DL — LOW (ref 2.5–4.5)
PLATELET # BLD AUTO: 242 K/UL — SIGNIFICANT CHANGE UP (ref 150–400)
POTASSIUM SERPL-MCNC: 3.8 MMOL/L — SIGNIFICANT CHANGE UP (ref 3.5–5.3)
POTASSIUM SERPL-SCNC: 3.8 MMOL/L — SIGNIFICANT CHANGE UP (ref 3.5–5.3)
PROT SERPL-MCNC: 6.8 GM/DL — SIGNIFICANT CHANGE UP (ref 6–8.3)
PROTHROM AB SERPL-ACNC: 11.8 SEC — SIGNIFICANT CHANGE UP (ref 10.5–13.4)
RBC # BLD: 3.78 M/UL — LOW (ref 3.8–5.2)
RBC # FLD: 13.5 % — SIGNIFICANT CHANGE UP (ref 10.3–14.5)
SODIUM SERPL-SCNC: 142 MMOL/L — SIGNIFICANT CHANGE UP (ref 135–145)
WBC # BLD: 8.73 K/UL — SIGNIFICANT CHANGE UP (ref 3.8–10.5)
WBC # FLD AUTO: 8.73 K/UL — SIGNIFICANT CHANGE UP (ref 3.8–10.5)

## 2023-07-12 PROCEDURE — 88304 TISSUE EXAM BY PATHOLOGIST: CPT | Mod: 26

## 2023-07-12 PROCEDURE — 99232 SBSQ HOSP IP/OBS MODERATE 35: CPT

## 2023-07-12 RX ORDER — ONDANSETRON 8 MG/1
4 TABLET, FILM COATED ORAL ONCE
Refills: 0 | Status: DISCONTINUED | OUTPATIENT
Start: 2023-07-12 | End: 2023-07-12

## 2023-07-12 RX ORDER — SODIUM CHLORIDE 9 MG/ML
1000 INJECTION, SOLUTION INTRAVENOUS
Refills: 0 | Status: DISCONTINUED | OUTPATIENT
Start: 2023-07-12 | End: 2023-07-12

## 2023-07-12 RX ORDER — FENTANYL CITRATE 50 UG/ML
50 INJECTION INTRAVENOUS
Refills: 0 | Status: DISCONTINUED | OUTPATIENT
Start: 2023-07-12 | End: 2023-07-12

## 2023-07-12 RX ORDER — OXYCODONE HYDROCHLORIDE 5 MG/1
5 TABLET ORAL ONCE
Refills: 0 | Status: DISCONTINUED | OUTPATIENT
Start: 2023-07-12 | End: 2023-07-12

## 2023-07-12 RX ADMIN — SODIUM CHLORIDE 100 MILLILITER(S): 9 INJECTION INTRAMUSCULAR; INTRAVENOUS; SUBCUTANEOUS at 06:11

## 2023-07-12 RX ADMIN — MORPHINE SULFATE 2 MILLIGRAM(S): 50 CAPSULE, EXTENDED RELEASE ORAL at 18:28

## 2023-07-12 RX ADMIN — PIPERACILLIN AND TAZOBACTAM 25 GRAM(S): 4; .5 INJECTION, POWDER, LYOPHILIZED, FOR SOLUTION INTRAVENOUS at 06:10

## 2023-07-12 RX ADMIN — MORPHINE SULFATE 2 MILLIGRAM(S): 50 CAPSULE, EXTENDED RELEASE ORAL at 08:21

## 2023-07-12 RX ADMIN — Medication 25 MILLIGRAM(S): at 09:27

## 2023-07-12 RX ADMIN — PIPERACILLIN AND TAZOBACTAM 25 GRAM(S): 4; .5 INJECTION, POWDER, LYOPHILIZED, FOR SOLUTION INTRAVENOUS at 23:36

## 2023-07-12 RX ADMIN — Medication 2 MILLIGRAM(S): at 21:49

## 2023-07-12 RX ADMIN — LOSARTAN POTASSIUM 100 MILLIGRAM(S): 100 TABLET, FILM COATED ORAL at 09:27

## 2023-07-12 RX ADMIN — MORPHINE SULFATE 2 MILLIGRAM(S): 50 CAPSULE, EXTENDED RELEASE ORAL at 08:06

## 2023-07-12 RX ADMIN — MORPHINE SULFATE 2 MILLIGRAM(S): 50 CAPSULE, EXTENDED RELEASE ORAL at 18:47

## 2023-07-12 RX ADMIN — LIDOCAINE 1 PATCH: 4 CREAM TOPICAL at 21:50

## 2023-07-12 RX ADMIN — PIPERACILLIN AND TAZOBACTAM 25 GRAM(S): 4; .5 INJECTION, POWDER, LYOPHILIZED, FOR SOLUTION INTRAVENOUS at 16:20

## 2023-07-12 RX ADMIN — ATORVASTATIN CALCIUM 10 MILLIGRAM(S): 80 TABLET, FILM COATED ORAL at 21:52

## 2023-07-12 RX ADMIN — Medication 25 MILLIGRAM(S): at 21:49

## 2023-07-12 RX ADMIN — SENNA PLUS 2 TABLET(S): 8.6 TABLET ORAL at 21:49

## 2023-07-12 RX ADMIN — PANTOPRAZOLE SODIUM 40 MILLIGRAM(S): 20 TABLET, DELAYED RELEASE ORAL at 06:10

## 2023-07-12 NOTE — PROGRESS NOTE ADULT - ASSESSMENT
66 yo F presented with choledocholithiasis s/p ERCP and sphincterotomy/stone/sludge removal and stent placement 7/10.    P;  NPO, IV fluids  Planning for RA possible lap possible open aric today  Pain and nausea control PRN  Rest of management as per primary team    To be discussed with Dr. Sosa.

## 2023-07-12 NOTE — BRIEF OPERATIVE NOTE - NSICDXBRIEFPROCEDURE_GEN_ALL_CORE_FT
PROCEDURES:  Robot-assisted laparoscopic cholecystectomy using indocyanine green (ICG) fluorescence imaging system and da Sonya X 12-Jul-2023 13:31:55  Giovanny Garcia, transversus abdominis plane, bilateral 12-Jul-2023 13:31:39  Gioavnny Garcia

## 2023-07-12 NOTE — PROGRESS NOTE ADULT - SUBJECTIVE AND OBJECTIVE BOX
Still with pain.      PHYSICAL EXAM:  General: NAD.  HEENT: no JVD, less jaundice   LUNGS: equal chest rise, normal effort  Abd: soft nt/nd   Wounds: clean dry and intact                 Hospital  Day#: 3  Procedure:  ERCP with stent by Dr. Lee    The patient is doing well with decreasing bilirubin.    Vital Signs Last 24 Hrs  T(C): 36.7 (12 Jul 2023 08:00), Max: 36.9 (11 Jul 2023 16:00)  T(F): 98.1 (12 Jul 2023 08:00), Max: 98.4 (11 Jul 2023 16:00)  HR: 59 (12 Jul 2023 08:00) (50 - 61)  BP: 191/91 (12 Jul 2023 08:00) (132/74 - 191/91)  BP(mean): 91 (11 Jul 2023 16:00) (91 - 91)  RR: 18 (12 Jul 2023 08:00) (18 - 18)  SpO2: 98% (12 Jul 2023 08:00) (96% - 100%)    Parameters below as of 12 Jul 2023 08:00  Patient On (Oxygen Delivery Method): room air        PHYSICAL EXAM:  General: NAD.  HEENT: no JVD, leszs jaundice.  LUNGS: CTAB.  Heart: S1 S2 RRR  Abd: soft nt/nd                             11.3   8.73  )-----------( 242      ( 12 Jul 2023 03:38 )             34.2       07-12    142  |  113<H>  |  12  ----------------------------<  86  3.8   |  25  |  0.70    Ca    8.4<L>      12 Jul 2023 03:38  Phos  2.0     07-12  Mg     1.8     07-12    TPro  6.8  /  Alb  2.4<L>  /  TBili  1.8<H>  /  DBili  x   /  AST  73<H>  /  ALT  114<H>  /  AlkPhos  161<H>  07-12      PT/INR - ( 12 Jul 2023 03:38 )   PT: 11.8 sec;   INR: 1.02 ratio

## 2023-07-12 NOTE — BRIEF OPERATIVE NOTE - OPERATION/FINDINGS
ERCP:   Needle knife sphincterotomy  Stone/sludge removal  Placement of 7 Fr stent
Thick, inflamed gallbladder removed without spillage

## 2023-07-12 NOTE — PROGRESS NOTE ADULT - SUBJECTIVE AND OBJECTIVE BOX
CC-  abd pain    HPI:  68 y/o F with PMH HTN, HLD, DJD, GERD, anxiety/depression, HIRA not on CPAP (diagnosed 10 years ago), surgery for rectocele, s/p carpal tunnel surgery, s/p back surgery, s/p b/l breast augmentation and tummy tuck, s/p lap band presented to [facility] with abdominal pain. As per H&P from [place] pt had a large meal at 8:30 prior to admission and 5 hours later woke up with severe epigastric pain and nausea. She took stool softeners gas x and xanax without relief. The pain was described as sharp and constant with radiation to the right.     Vitals from 7/8/23: /83, SpO2 90-98% on room air. Labs: K+ 3.4, glucose 109, WBC 11.77, LFTs were initially normal upon admission. CT abd/pelvis: thickened gallbladder wall with cholelithiasis and biliary ductal dilation, suggestive of acute cholecystitis in the appropriate clinical setting. Markedly thickened gastric fundus with focal outpouching concerning for gastritis or PUD. Lap bad in appropriate position. Degenerative changes of lumbar spine, s/p lumbar fusion L4 to S1. MRI lumbar spine advised if further characterization is warranted.     Pt was admitted to [place]. Given Zosyn, IVF, and pain medications. She was seen by gastroenterology for increasing LFTs and probable cholangitis. She was started on Zosyn. Pt had a 5F by 4 cm pancreatic stent placed. However, she was transferred to  for ERCP by Dr. Lee.     Upon my evaluation she does not have any pain. She has been passing gas, no bowel movement in a few days. Denies headaches, blurry vision, dizziness, fevers, chills, chest pain, SOB, abdominal pain, N/V, diarrhea/constipation.      At , VSS. Labs: WBC 14.61, Hb 11.4, INR 1.34, K+ 3.3, glucose 151, albumin 2.4, total bilirubin 4.6, alkaline phosphatase 171, AST 62, . She was admitted to med/surg and placed NPO for ERCP tomorrow.     Pt was evaluated by Dr. Estrada; however, she wanted Dr. Sawyer who performed her lap band to be completed by him.  (10 Jul 2023 03:39)    7/11/23- s/p ERCP and stent placement yesterday. No pain today. Ambulating  7/12/23- seen in PACU postop lap aric    Review of system- All 10 systems reviewed and is as per HPI otherwise negative.     Vital Signs Last 24 Hrs  T(C): 36.7 (12 Jul 2023 15:37), Max: 36.8 (12 Jul 2023 00:00)  T(F): 98 (12 Jul 2023 15:37), Max: 98.3 (12 Jul 2023 13:08)  HR: 55 (12 Jul 2023 15:37) (55 - 64)  BP: 157/79 (12 Jul 2023 15:37) (90/42 - 191/91)  BP(mean): 100 (12 Jul 2023 15:37) (100 - 100)  RR: 18 (12 Jul 2023 15:37) (12 - 21)  SpO2: 97% (12 Jul 2023 15:37) (95% - 100%)    Parameters below as of 12 Jul 2023 15:37  Patient On (Oxygen Delivery Method): room air      LABS:                              11.3   8.73  )-----------( 242      ( 12 Jul 2023 03:38 )             34.2     12 Jul 2023 03:38    142    |  113    |  12     ----------------------------<  86     3.8     |  25     |  0.70     Ca    8.4        12 Jul 2023 03:38  Phos  2.0       12 Jul 2023 03:38  Mg     1.8       12 Jul 2023 03:38    TPro  6.8    /  Alb  2.4    /  TBili  1.8    /  DBili  x      /  AST  73     /  ALT  114    /  AlkPhos  161    12 Jul 2023 03:38    LIVER FUNCTIONS - ( 12 Jul 2023 03:38 )  Alb: 2.4 g/dL / Pro: 6.8 gm/dL / ALK PHOS: 161 U/L / ALT: 114 U/L / AST: 73 U/L / GGT: x           PT/INR - ( 12 Jul 2023 03:38 )   PT: 11.8 sec;   INR: 1.02 ratio      Urinalysis Basic - ( 12 Jul 2023 03:38 )  Color: x / Appearance: x / SG: x / pH: x  Gluc: 86 mg/dL / Ketone: x  / Bili: x / Urobili: x   Blood: x / Protein: x / Nitrite: x   Leuk Esterase: x / RBC: x / WBC x   Sq Epi: x / Non Sq Epi: x / Bacteria: x    RADIOLOGY & ADDITIONAL TESTS:      PHYSICAL EXAM:  GENERAL: NAD, well-groomed, well-developed  HEAD:  Atraumatic, Normocephalic  EYES: EOMI, PERRLA, conjunctiva and sclera clear  HEENT: Moist mucous membranes  NECK: Supple, No JVD  NERVOUS SYSTEM:  Alert & Oriented X3, Motor Strength 5/5 B/L upper and lower extremities; DTRs 2+ intact and symmetric  CHEST/LUNG: Clear to auscultation bilaterally; No rales, rhonchi, wheezing, or rubs  HEART: Regular rate and rhythm; No murmurs, rubs, or gallops  ABDOMEN: Soft, postop lap sites are clean  GENITOURINARY- Voiding, no palpable bladder  EXTREMITIES:  2+ Peripheral Pulses, No clubbing, cyanosis, or edema  MUSCULOSKELTAL- No muscle tenderness, Muscle tone normal, No joint tenderness, no Joint swelling, Joint range of motion-normal  SKIN-no rash, no lesion  CNS- alert, oriented X3, non focal     MEDICATIONS  (STANDING):  atorvastatin 10 milliGRAM(s) Oral at bedtime  hydrochlorothiazide 25 milliGRAM(s) Oral daily  lidocaine   4% Patch 1 Patch Transdermal daily  losartan 100 milliGRAM(s) Oral daily  metoprolol succinate ER 25 milliGRAM(s) Oral two times a day  naloxone Injectable 0.4 milliGRAM(s) IV Push once  pantoprazole    Tablet 40 milliGRAM(s) Oral before breakfast  piperacillin/tazobactam IVPB.. 3.375 Gram(s) IV Intermittent every 8 hours  polyethylene glycol 3350 17 Gram(s) Oral daily  potassium chloride    Tablet ER 40 milliEquivalent(s) Oral every 4 hours  senna 2 Tablet(s) Oral at bedtime  sodium chloride 0.9%. 1000 milliLiter(s) (100 mL/Hr) IV Continuous <Continuous>  vortioxetine 30 milliGRAM(s) Oral daily    MEDICATIONS  (PRN):  acetaminophen     Tablet .. 650 milliGRAM(s) Oral every 6 hours PRN Temp greater or equal to 38C (100.4F), Mild Pain (1 - 3)  ALPRAZolam 2 milliGRAM(s) Oral three times a day PRN for anxiety  aluminum hydroxide/magnesium hydroxide/simethicone Suspension 30 milliLiter(s) Oral every 4 hours PRN Dyspepsia  bisacodyl 5 milliGRAM(s) Oral daily PRN Constipation  melatonin 3 milliGRAM(s) Oral at bedtime PRN Insomnia  morphine  - Injectable 4 milliGRAM(s) IV Push every 4 hours PRN Severe Pain (7 - 10)  morphine  - Injectable 2 milliGRAM(s) IV Push every 4 hours PRN Moderate Pain (4 - 6)  ondansetron Injectable 4 milliGRAM(s) IV Push every 8 hours PRN Nausea and/or Vomiting    Assessment/Plan  #Acute gallstone cholecystitis with choledocholithiasis  #S/p lap aric  GI following  S/p ERCP with sphincterectomy, stent placement and stones extraction  Surg team following  Pain meds prn  Diet per surg team  Incentive spirometry  OOb to ambulate    #Hypokalemia- repleted    #HTN/hyperlipidemia  Cont home meds    #MIld anemia likely dilutional  Monitor     #DVT pproph- ambulate, venodynes    #Dispo- likely home tomorrow

## 2023-07-12 NOTE — PROGRESS NOTE ADULT - PROBLEM SELECTOR PLAN 1
- Correct electrolytes  - NPO p MN  - Cholecystectomy tomorrow -   - NPO p MN  - Robotic cholecystectomy today

## 2023-07-13 LAB
ALBUMIN SERPL ELPH-MCNC: 2.4 G/DL — LOW (ref 3.3–5)
ALP SERPL-CCNC: 173 U/L — HIGH (ref 40–120)
ALT FLD-CCNC: 154 U/L — HIGH (ref 12–78)
ANION GAP SERPL CALC-SCNC: 8 MMOL/L — SIGNIFICANT CHANGE UP (ref 5–17)
AST SERPL-CCNC: 94 U/L — HIGH (ref 15–37)
BASOPHILS # BLD AUTO: 0.03 K/UL — SIGNIFICANT CHANGE UP (ref 0–0.2)
BASOPHILS NFR BLD AUTO: 0.3 % — SIGNIFICANT CHANGE UP (ref 0–2)
BILIRUB SERPL-MCNC: 1.5 MG/DL — HIGH (ref 0.2–1.2)
BUN SERPL-MCNC: 9 MG/DL — SIGNIFICANT CHANGE UP (ref 7–23)
CALCIUM SERPL-MCNC: 8.4 MG/DL — LOW (ref 8.5–10.1)
CHLORIDE SERPL-SCNC: 105 MMOL/L — SIGNIFICANT CHANGE UP (ref 96–108)
CO2 SERPL-SCNC: 25 MMOL/L — SIGNIFICANT CHANGE UP (ref 22–31)
CREAT SERPL-MCNC: 0.62 MG/DL — SIGNIFICANT CHANGE UP (ref 0.5–1.3)
EGFR: 98 ML/MIN/1.73M2 — SIGNIFICANT CHANGE UP
EOSINOPHIL # BLD AUTO: 0.04 K/UL — SIGNIFICANT CHANGE UP (ref 0–0.5)
EOSINOPHIL NFR BLD AUTO: 0.4 % — SIGNIFICANT CHANGE UP (ref 0–6)
GLUCOSE SERPL-MCNC: 120 MG/DL — HIGH (ref 70–99)
HCT VFR BLD CALC: 31.7 % — LOW (ref 34.5–45)
HGB BLD-MCNC: 11 G/DL — LOW (ref 11.5–15.5)
IMM GRANULOCYTES NFR BLD AUTO: 0.4 % — SIGNIFICANT CHANGE UP (ref 0–0.9)
LYMPHOCYTES # BLD AUTO: 2.37 K/UL — SIGNIFICANT CHANGE UP (ref 1–3.3)
LYMPHOCYTES # BLD AUTO: 21.6 % — SIGNIFICANT CHANGE UP (ref 13–44)
MAGNESIUM SERPL-MCNC: 1.6 MG/DL — SIGNIFICANT CHANGE UP (ref 1.6–2.6)
MCHC RBC-ENTMCNC: 30.4 PG — SIGNIFICANT CHANGE UP (ref 27–34)
MCHC RBC-ENTMCNC: 34.7 GM/DL — SIGNIFICANT CHANGE UP (ref 32–36)
MCV RBC AUTO: 87.6 FL — SIGNIFICANT CHANGE UP (ref 80–100)
MONOCYTES # BLD AUTO: 0.84 K/UL — SIGNIFICANT CHANGE UP (ref 0–0.9)
MONOCYTES NFR BLD AUTO: 7.6 % — SIGNIFICANT CHANGE UP (ref 2–14)
NEUTROPHILS # BLD AUTO: 7.67 K/UL — HIGH (ref 1.8–7.4)
NEUTROPHILS NFR BLD AUTO: 69.7 % — SIGNIFICANT CHANGE UP (ref 43–77)
PHOSPHATE SERPL-MCNC: 3.3 MG/DL — SIGNIFICANT CHANGE UP (ref 2.5–4.5)
PLATELET # BLD AUTO: 268 K/UL — SIGNIFICANT CHANGE UP (ref 150–400)
POTASSIUM SERPL-MCNC: 2.9 MMOL/L — CRITICAL LOW (ref 3.5–5.3)
POTASSIUM SERPL-SCNC: 2.9 MMOL/L — CRITICAL LOW (ref 3.5–5.3)
PROT SERPL-MCNC: 6.7 GM/DL — SIGNIFICANT CHANGE UP (ref 6–8.3)
RBC # BLD: 3.62 M/UL — LOW (ref 3.8–5.2)
RBC # FLD: 13 % — SIGNIFICANT CHANGE UP (ref 10.3–14.5)
SODIUM SERPL-SCNC: 138 MMOL/L — SIGNIFICANT CHANGE UP (ref 135–145)
WBC # BLD: 10.99 K/UL — HIGH (ref 3.8–10.5)
WBC # FLD AUTO: 10.99 K/UL — HIGH (ref 3.8–10.5)

## 2023-07-13 PROCEDURE — 99232 SBSQ HOSP IP/OBS MODERATE 35: CPT

## 2023-07-13 PROCEDURE — 12345: CPT | Mod: NC

## 2023-07-13 RX ORDER — OXYCODONE AND ACETAMINOPHEN 5; 325 MG/1; MG/1
1 TABLET ORAL
Qty: 12 | Refills: 0
Start: 2023-07-13 | End: 2023-07-15

## 2023-07-13 RX ORDER — POTASSIUM CHLORIDE 20 MEQ
10 PACKET (EA) ORAL
Refills: 0 | Status: COMPLETED | OUTPATIENT
Start: 2023-07-13 | End: 2023-07-13

## 2023-07-13 RX ORDER — AMLODIPINE BESYLATE 2.5 MG/1
5 TABLET ORAL DAILY
Refills: 0 | Status: DISCONTINUED | OUTPATIENT
Start: 2023-07-13 | End: 2023-07-14

## 2023-07-13 RX ORDER — POTASSIUM CHLORIDE 20 MEQ
40 PACKET (EA) ORAL EVERY 4 HOURS
Refills: 0 | Status: DISCONTINUED | OUTPATIENT
Start: 2023-07-13 | End: 2023-07-13

## 2023-07-13 RX ORDER — POTASSIUM CHLORIDE 20 MEQ
40 PACKET (EA) ORAL
Refills: 0 | Status: COMPLETED | OUTPATIENT
Start: 2023-07-13 | End: 2023-07-13

## 2023-07-13 RX ORDER — OXYCODONE HYDROCHLORIDE 5 MG/1
2.5 TABLET ORAL EVERY 4 HOURS
Refills: 0 | Status: DISCONTINUED | OUTPATIENT
Start: 2023-07-13 | End: 2023-07-14

## 2023-07-13 RX ORDER — HYDRALAZINE HCL 50 MG
10 TABLET ORAL EVERY 6 HOURS
Refills: 0 | Status: DISCONTINUED | OUTPATIENT
Start: 2023-07-13 | End: 2023-07-14

## 2023-07-13 RX ORDER — HEPARIN SODIUM 5000 [USP'U]/ML
5000 INJECTION INTRAVENOUS; SUBCUTANEOUS EVERY 8 HOURS
Refills: 0 | Status: DISCONTINUED | OUTPATIENT
Start: 2023-07-13 | End: 2023-07-14

## 2023-07-13 RX ORDER — SODIUM CHLORIDE 9 MG/ML
3 INJECTION INTRAMUSCULAR; INTRAVENOUS; SUBCUTANEOUS EVERY 8 HOURS
Refills: 0 | Status: DISCONTINUED | OUTPATIENT
Start: 2023-07-13 | End: 2023-07-14

## 2023-07-13 RX ORDER — OXYCODONE HYDROCHLORIDE 5 MG/1
5 TABLET ORAL EVERY 4 HOURS
Refills: 0 | Status: DISCONTINUED | OUTPATIENT
Start: 2023-07-13 | End: 2023-07-14

## 2023-07-13 RX ADMIN — Medication 10 MILLIGRAM(S): at 06:55

## 2023-07-13 RX ADMIN — PANTOPRAZOLE SODIUM 40 MILLIGRAM(S): 20 TABLET, DELAYED RELEASE ORAL at 05:27

## 2023-07-13 RX ADMIN — SODIUM CHLORIDE 3 MILLILITER(S): 9 INJECTION INTRAMUSCULAR; INTRAVENOUS; SUBCUTANEOUS at 22:30

## 2023-07-13 RX ADMIN — OXYCODONE HYDROCHLORIDE 5 MILLIGRAM(S): 5 TABLET ORAL at 13:43

## 2023-07-13 RX ADMIN — HEPARIN SODIUM 5000 UNIT(S): 5000 INJECTION INTRAVENOUS; SUBCUTANEOUS at 22:25

## 2023-07-13 RX ADMIN — SODIUM CHLORIDE 3 MILLILITER(S): 9 INJECTION INTRAMUSCULAR; INTRAVENOUS; SUBCUTANEOUS at 13:38

## 2023-07-13 RX ADMIN — SENNA PLUS 2 TABLET(S): 8.6 TABLET ORAL at 22:25

## 2023-07-13 RX ADMIN — Medication 25 MILLIGRAM(S): at 10:18

## 2023-07-13 RX ADMIN — Medication 25 MILLIGRAM(S): at 22:25

## 2023-07-13 RX ADMIN — OXYCODONE HYDROCHLORIDE 5 MILLIGRAM(S): 5 TABLET ORAL at 14:13

## 2023-07-13 RX ADMIN — Medication 650 MILLIGRAM(S): at 05:56

## 2023-07-13 RX ADMIN — Medication 650 MILLIGRAM(S): at 23:03

## 2023-07-13 RX ADMIN — LIDOCAINE 1 PATCH: 4 CREAM TOPICAL at 06:41

## 2023-07-13 RX ADMIN — ATORVASTATIN CALCIUM 10 MILLIGRAM(S): 80 TABLET, FILM COATED ORAL at 22:25

## 2023-07-13 RX ADMIN — MORPHINE SULFATE 2 MILLIGRAM(S): 50 CAPSULE, EXTENDED RELEASE ORAL at 04:20

## 2023-07-13 RX ADMIN — HEPARIN SODIUM 5000 UNIT(S): 5000 INJECTION INTRAVENOUS; SUBCUTANEOUS at 13:43

## 2023-07-13 RX ADMIN — AMLODIPINE BESYLATE 5 MILLIGRAM(S): 2.5 TABLET ORAL at 10:19

## 2023-07-13 RX ADMIN — MORPHINE SULFATE 2 MILLIGRAM(S): 50 CAPSULE, EXTENDED RELEASE ORAL at 04:05

## 2023-07-13 RX ADMIN — Medication 650 MILLIGRAM(S): at 22:33

## 2023-07-13 RX ADMIN — Medication 100 MILLIEQUIVALENT(S): at 10:24

## 2023-07-13 RX ADMIN — OXYCODONE HYDROCHLORIDE 5 MILLIGRAM(S): 5 TABLET ORAL at 18:09

## 2023-07-13 RX ADMIN — OXYCODONE HYDROCHLORIDE 5 MILLIGRAM(S): 5 TABLET ORAL at 17:39

## 2023-07-13 RX ADMIN — LOSARTAN POTASSIUM 100 MILLIGRAM(S): 100 TABLET, FILM COATED ORAL at 05:58

## 2023-07-13 RX ADMIN — LIDOCAINE 1 PATCH: 4 CREAM TOPICAL at 10:38

## 2023-07-13 RX ADMIN — Medication 2 MILLIGRAM(S): at 22:25

## 2023-07-13 RX ADMIN — Medication 650 MILLIGRAM(S): at 05:26

## 2023-07-13 RX ADMIN — Medication 100 MILLIEQUIVALENT(S): at 13:43

## 2023-07-13 RX ADMIN — Medication 650 MILLIGRAM(S): at 12:36

## 2023-07-13 RX ADMIN — Medication 40 MILLIEQUIVALENT(S): at 10:24

## 2023-07-13 RX ADMIN — POLYETHYLENE GLYCOL 3350 17 GRAM(S): 17 POWDER, FOR SOLUTION ORAL at 10:18

## 2023-07-13 RX ADMIN — Medication 650 MILLIGRAM(S): at 13:06

## 2023-07-13 RX ADMIN — Medication 40 MILLIEQUIVALENT(S): at 12:36

## 2023-07-13 RX ADMIN — VORTIOXETINE 30 MILLIGRAM(S): 5 TABLET, FILM COATED ORAL at 10:19

## 2023-07-13 NOTE — PROGRESS NOTE ADULT - SUBJECTIVE AND OBJECTIVE BOX
CC-  abd pain    HPI:  66 y/o F with PMH HTN, HLD, DJD, GERD, anxiety/depression, HIRA not on CPAP (diagnosed 10 years ago), surgery for rectocele, s/p carpal tunnel surgery, s/p back surgery, s/p b/l breast augmentation and tummy tuck, s/p lap band presented to [facility] with abdominal pain. As per H&P from [place] pt had a large meal at 8:30 prior to admission and 5 hours later woke up with severe epigastric pain and nausea. She took stool softeners gas x and xanax without relief. The pain was described as sharp and constant with radiation to the right.     Vitals from 7/8/23: /83, SpO2 90-98% on room air. Labs: K+ 3.4, glucose 109, WBC 11.77, LFTs were initially normal upon admission. CT abd/pelvis: thickened gallbladder wall with cholelithiasis and biliary ductal dilation, suggestive of acute cholecystitis in the appropriate clinical setting. Markedly thickened gastric fundus with focal outpouching concerning for gastritis or PUD. Lap bad in appropriate position. Degenerative changes of lumbar spine, s/p lumbar fusion L4 to S1. MRI lumbar spine advised if further characterization is warranted.     Pt was admitted to [place]. Given Zosyn, IVF, and pain medications. She was seen by gastroenterology for increasing LFTs and probable cholangitis. She was started on Zosyn. Pt had a 5F by 4 cm pancreatic stent placed. However, she was transferred to  for ERCP by Dr. Lee.     Upon my evaluation she does not have any pain. She has been passing gas, no bowel movement in a few days. Denies headaches, blurry vision, dizziness, fevers, chills, chest pain, SOB, abdominal pain, N/V, diarrhea/constipation.      At , VSS. Labs: WBC 14.61, Hb 11.4, INR 1.34, K+ 3.3, glucose 151, albumin 2.4, total bilirubin 4.6, alkaline phosphatase 171, AST 62, . She was admitted to med/surg and placed NPO for ERCP tomorrow.     Pt was evaluated by Dr. Estrada; however, she wanted Dr. Sawyer who performed her lap band to be completed by him.  (10 Jul 2023 03:39)    7/11/23- s/p ERCP and stent placement yesterday. No pain today. Ambulating  7/12/23- seen in PACU postop lap aric  7/13/23- s/p lap aric yesterday, doing good today, pain is OK controlled    Review of system- All 10 systems reviewed and is as per HPI otherwise negative.     Vital Signs Last 24 Hrs  T(C): 36.7 (13 Jul 2023 11:29), Max: 36.8 (13 Jul 2023 00:10)  T(F): 98.1 (13 Jul 2023 11:29), Max: 98.2 (13 Jul 2023 00:10)  HR: 63 (13 Jul 2023 11:29) (52 - 83)  BP: 138/52 (13 Jul 2023 11:29) (113/59 - 190/70)  BP(mean): 100 (12 Jul 2023 15:37) (100 - 100)  RR: 18 (13 Jul 2023 11:29) (12 - 18)  SpO2: 92% (13 Jul 2023 11:29) (92% - 99%)    Parameters below as of 13 Jul 2023 11:29  Patient On (Oxygen Delivery Method): room air    LABS:                         11.0   10.99 )-----------( 268      ( 13 Jul 2023 08:20 )             31.7     13 Jul 2023 08:20    138    |  105    |  9      ----------------------------<  120    2.9     |  25     |  0.62     Ca    8.4        13 Jul 2023 08:20  Phos  3.3       13 Jul 2023 08:20  Mg     1.6       13 Jul 2023 08:20    TPro  6.7    /  Alb  2.4    /  TBili  1.5    /  DBili  x      /  AST  94     /  ALT  154    /  AlkPhos  173    13 Jul 2023 08:20    LIVER FUNCTIONS - ( 13 Jul 2023 08:20 )  Alb: 2.4 g/dL / Pro: 6.7 gm/dL / ALK PHOS: 173 U/L / ALT: 154 U/L / AST: 94 U/L / GGT: x           PT/INR - ( 12 Jul 2023 03:38 )   PT: 11.8 sec;   INR: 1.02 ratio      Urinalysis Basic - ( 13 Jul 2023 08:20 )  Color: x / Appearance: x / SG: x / pH: x  Gluc: 120 mg/dL / Ketone: x  / Bili: x / Urobili: x   Blood: x / Protein: x / Nitrite: x   Leuk Esterase: x / RBC: x / WBC x   Sq Epi: x / Non Sq Epi: x / Bacteria: x    RADIOLOGY & ADDITIONAL TESTS:      PHYSICAL EXAM:  GENERAL: NAD, well-groomed, well-developed  HEAD:  Atraumatic, Normocephalic  EYES: EOMI, PERRLA, conjunctiva and sclera clear  HEENT: Moist mucous membranes  NECK: Supple, No JVD  NERVOUS SYSTEM:  Alert & Oriented X3, Motor Strength 5/5 B/L upper and lower extremities; DTRs 2+ intact and symmetric  CHEST/LUNG: Clear to auscultation bilaterally; No rales, rhonchi, wheezing, or rubs  HEART: Regular rate and rhythm; No murmurs, rubs, or gallops  ABDOMEN: Soft, postop lap sites are clean  GENITOURINARY- Voiding, no palpable bladder  EXTREMITIES:  2+ Peripheral Pulses, No clubbing, cyanosis, or edema  MUSCULOSKELTAL- No muscle tenderness, Muscle tone normal, No joint tenderness, no Joint swelling, Joint range of motion-normal  SKIN-no rash, no lesion  CNS- alert, oriented X3, non focal     MEDICATIONS  (STANDING):  atorvastatin 10 milliGRAM(s) Oral at bedtime  hydrochlorothiazide 25 milliGRAM(s) Oral daily  lidocaine   4% Patch 1 Patch Transdermal daily  losartan 100 milliGRAM(s) Oral daily  metoprolol succinate ER 25 milliGRAM(s) Oral two times a day  naloxone Injectable 0.4 milliGRAM(s) IV Push once  pantoprazole    Tablet 40 milliGRAM(s) Oral before breakfast  piperacillin/tazobactam IVPB.. 3.375 Gram(s) IV Intermittent every 8 hours  polyethylene glycol 3350 17 Gram(s) Oral daily  potassium chloride    Tablet ER 40 milliEquivalent(s) Oral every 4 hours  senna 2 Tablet(s) Oral at bedtime  sodium chloride 0.9%. 1000 milliLiter(s) (100 mL/Hr) IV Continuous <Continuous>  vortioxetine 30 milliGRAM(s) Oral daily    MEDICATIONS  (PRN):  acetaminophen     Tablet .. 650 milliGRAM(s) Oral every 6 hours PRN Temp greater or equal to 38C (100.4F), Mild Pain (1 - 3)  ALPRAZolam 2 milliGRAM(s) Oral three times a day PRN for anxiety  aluminum hydroxide/magnesium hydroxide/simethicone Suspension 30 milliLiter(s) Oral every 4 hours PRN Dyspepsia  bisacodyl 5 milliGRAM(s) Oral daily PRN Constipation  melatonin 3 milliGRAM(s) Oral at bedtime PRN Insomnia  morphine  - Injectable 4 milliGRAM(s) IV Push every 4 hours PRN Severe Pain (7 - 10)  morphine  - Injectable 2 milliGRAM(s) IV Push every 4 hours PRN Moderate Pain (4 - 6)  ondansetron Injectable 4 milliGRAM(s) IV Push every 8 hours PRN Nausea and/or Vomiting    Assessment/Plan  #Acute gallstone cholecystitis with choledocholithiasis  #S/p lap aric  GI following  S/p ERCP with sphincterectomy, stent placement and stones extraction  Surg team following  Pain meds prn  Diet advanced, tolerating well  Incentive spirometry  OOb to ambulate    #Hypokalemia- replete    #HTN/hyperlipidemia  Cont home meds    #MIld anemia likely dilutional  Monitor     #DVT proph- ambulate, venodynes    #Dispo- likely home tomorrow     CC-  abd pain    HPI:  68 y/o F with PMH HTN, HLD, DJD, GERD, anxiety/depression, HIRA not on CPAP (diagnosed 10 years ago), surgery for rectocele, s/p carpal tunnel surgery, s/p back surgery, s/p b/l breast augmentation and tummy tuck, s/p lap band presented to [facility] with abdominal pain. As per H&P from [place] pt had a large meal at 8:30 prior to admission and 5 hours later woke up with severe epigastric pain and nausea. She took stool softeners gas x and xanax without relief. The pain was described as sharp and constant with radiation to the right.     Vitals from 7/8/23: /83, SpO2 90-98% on room air. Labs: K+ 3.4, glucose 109, WBC 11.77, LFTs were initially normal upon admission. CT abd/pelvis: thickened gallbladder wall with cholelithiasis and biliary ductal dilation, suggestive of acute cholecystitis in the appropriate clinical setting. Markedly thickened gastric fundus with focal outpouching concerning for gastritis or PUD. Lap bad in appropriate position. Degenerative changes of lumbar spine, s/p lumbar fusion L4 to S1. MRI lumbar spine advised if further characterization is warranted.     Pt was admitted to [place]. Given Zosyn, IVF, and pain medications. She was seen by gastroenterology for increasing LFTs and probable cholangitis. She was started on Zosyn. Pt had a 5F by 4 cm pancreatic stent placed. However, she was transferred to  for ERCP by Dr. Lee.     Upon my evaluation she does not have any pain. She has been passing gas, no bowel movement in a few days. Denies headaches, blurry vision, dizziness, fevers, chills, chest pain, SOB, abdominal pain, N/V, diarrhea/constipation.      At , VSS. Labs: WBC 14.61, Hb 11.4, INR 1.34, K+ 3.3, glucose 151, albumin 2.4, total bilirubin 4.6, alkaline phosphatase 171, AST 62, . She was admitted to med/surg and placed NPO for ERCP tomorrow.     Pt was evaluated by Dr. Estrada; however, she wanted Dr. Sawyer who performed her lap band to be completed by him.  (10 Jul 2023 03:39)    7/11/23- s/p ERCP and stent placement yesterday. No pain today. Ambulating  7/12/23- seen in PACU postop lap aric  7/13/23- s/p lap aric yesterday, doing good today, pain is OK controlled    Review of system- All 10 systems reviewed and is as per HPI otherwise negative.     Vital Signs Last 24 Hrs  T(C): 36.7 (13 Jul 2023 11:29), Max: 36.8 (13 Jul 2023 00:10)  T(F): 98.1 (13 Jul 2023 11:29), Max: 98.2 (13 Jul 2023 00:10)  HR: 63 (13 Jul 2023 11:29) (52 - 83)  BP: 138/52 (13 Jul 2023 11:29) (113/59 - 190/70)  BP(mean): 100 (12 Jul 2023 15:37) (100 - 100)  RR: 18 (13 Jul 2023 11:29) (12 - 18)  SpO2: 92% (13 Jul 2023 11:29) (92% - 99%)    Parameters below as of 13 Jul 2023 11:29  Patient On (Oxygen Delivery Method): room air    LABS:                         11.0   10.99 )-----------( 268      ( 13 Jul 2023 08:20 )             31.7     13 Jul 2023 08:20    138    |  105    |  9      ----------------------------<  120    2.9     |  25     |  0.62     Ca    8.4        13 Jul 2023 08:20  Phos  3.3       13 Jul 2023 08:20  Mg     1.6       13 Jul 2023 08:20    TPro  6.7    /  Alb  2.4    /  TBili  1.5    /  DBili  x      /  AST  94     /  ALT  154    /  AlkPhos  173    13 Jul 2023 08:20    LIVER FUNCTIONS - ( 13 Jul 2023 08:20 )  Alb: 2.4 g/dL / Pro: 6.7 gm/dL / ALK PHOS: 173 U/L / ALT: 154 U/L / AST: 94 U/L / GGT: x           PT/INR - ( 12 Jul 2023 03:38 )   PT: 11.8 sec;   INR: 1.02 ratio      Urinalysis Basic - ( 13 Jul 2023 08:20 )  Color: x / Appearance: x / SG: x / pH: x  Gluc: 120 mg/dL / Ketone: x  / Bili: x / Urobili: x   Blood: x / Protein: x / Nitrite: x   Leuk Esterase: x / RBC: x / WBC x   Sq Epi: x / Non Sq Epi: x / Bacteria: x    RADIOLOGY & ADDITIONAL TESTS:      PHYSICAL EXAM:  GENERAL: NAD, well-groomed, well-developed  HEAD:  Atraumatic, Normocephalic  EYES: EOMI, PERRLA, conjunctiva and sclera clear  HEENT: Moist mucous membranes  NECK: Supple, No JVD  NERVOUS SYSTEM:  Alert & Oriented X3, Motor Strength 5/5 B/L upper and lower extremities; DTRs 2+ intact and symmetric  CHEST/LUNG: Clear to auscultation bilaterally; No rales, rhonchi, wheezing, or rubs  HEART: Regular rate and rhythm; No murmurs, rubs, or gallops  ABDOMEN: Soft, postop lap sites are clean  GENITOURINARY- Voiding, no palpable bladder  EXTREMITIES:  2+ Peripheral Pulses, No clubbing, cyanosis, or edema  MUSCULOSKELTAL- No muscle tenderness, Muscle tone normal, No joint tenderness, no Joint swelling, Joint range of motion-normal  SKIN-no rash, no lesion  CNS- alert, oriented X3, non focal     MEDICATIONS  (STANDING):  atorvastatin 10 milliGRAM(s) Oral at bedtime  hydrochlorothiazide 25 milliGRAM(s) Oral daily  lidocaine   4% Patch 1 Patch Transdermal daily  losartan 100 milliGRAM(s) Oral daily  metoprolol succinate ER 25 milliGRAM(s) Oral two times a day  naloxone Injectable 0.4 milliGRAM(s) IV Push once  pantoprazole    Tablet 40 milliGRAM(s) Oral before breakfast  piperacillin/tazobactam IVPB.. 3.375 Gram(s) IV Intermittent every 8 hours  polyethylene glycol 3350 17 Gram(s) Oral daily  potassium chloride    Tablet ER 40 milliEquivalent(s) Oral every 4 hours  senna 2 Tablet(s) Oral at bedtime  sodium chloride 0.9%. 1000 milliLiter(s) (100 mL/Hr) IV Continuous <Continuous>  vortioxetine 30 milliGRAM(s) Oral daily    MEDICATIONS  (PRN):  acetaminophen     Tablet .. 650 milliGRAM(s) Oral every 6 hours PRN Temp greater or equal to 38C (100.4F), Mild Pain (1 - 3)  ALPRAZolam 2 milliGRAM(s) Oral three times a day PRN for anxiety  aluminum hydroxide/magnesium hydroxide/simethicone Suspension 30 milliLiter(s) Oral every 4 hours PRN Dyspepsia  bisacodyl 5 milliGRAM(s) Oral daily PRN Constipation  melatonin 3 milliGRAM(s) Oral at bedtime PRN Insomnia  morphine  - Injectable 4 milliGRAM(s) IV Push every 4 hours PRN Severe Pain (7 - 10)  morphine  - Injectable 2 milliGRAM(s) IV Push every 4 hours PRN Moderate Pain (4 - 6)  ondansetron Injectable 4 milliGRAM(s) IV Push every 8 hours PRN Nausea and/or Vomiting    Assessment/Plan  #Acute gallstone cholecystitis with choledocholithiasis  #S/p lap aric  GI following  S/p ERCP with sphincterectomy, stent placement and stones extraction  Surg team following  Pain meds prn  Diet advanced, tolerating well  Incentive spirometry  OOb to ambulate    #Hypokalemia- replete    #HTN  BP runs high  Start on Norvasc 5mg daily  Cont hydralazine prn  Adjust further    #Hyperlipidemia- cont statin    #MIld anemia likely dilutional  Monitor     #DVT proph- ambulate, venodynes    #Dispo- likely home tomorrow

## 2023-07-13 NOTE — CHART NOTE - NSCHARTNOTEFT_GEN_A_CORE
Received a call from RN. BP was 176/83, pt was given pain medication. Repeat /70 with HR 53. Will give HCTZ and Cozaar early. Added PRN hydralazine for SBP > 160.

## 2023-07-13 NOTE — PROGRESS NOTE ADULT - SUBJECTIVE AND OBJECTIVE BOX
Pt. seen and examined at bedside this morning. Patient reports pain at incisions, voiding. She denies fever, chest pain, SOB, nausea, vomiting. No acute events overnight.    PHYSICAL EXAM:  General: NAD, obese, appears stated age  HEENT: no JVD, trachea midline  LUNGS: normal effort, equal chest rise  Heart: RRR  Abd: incisions with no erythema or induration, obese, soft, appropriately tender          Post Op Day#: 1  Procedure:  Laparoscopic robotic assisted cholecystectomy    The patient is doing well, hypertensive overnight, but otherwise stable. Patient less jaundice today.     Vital Signs Last 24 Hrs  T(C): 36.5 (13 Jul 2023 07:38), Max: 36.8 (12 Jul 2023 13:08)  T(F): 97.7 (13 Jul 2023 07:38), Max: 98.3 (12 Jul 2023 13:08)  HR: 61 (13 Jul 2023 07:38) (52 - 83)  BP: 167/80 (13 Jul 2023 07:38) (90/42 - 190/70)  BP(mean): 100 (12 Jul 2023 15:37) (100 - 100)  RR: 18 (13 Jul 2023 07:38) (12 - 21)  SpO2: 94% (13 Jul 2023 07:38) (94% - 99%)    Parameters below as of 13 Jul 2023 07:38  Patient On (Oxygen Delivery Method): room air        PHYSICAL EXAM:  General: NAD.  HEENT: no JVD, slight jaundice.  LUNGS: CTAB.  Heart: S1 S2 RRR  Abd: soft nt/nd   Wounds: clean dry and intact                          11.0   10.99 )-----------( 268      ( 13 Jul 2023 08:20 )             31.7       07-13    138  |  105  |  9   ----------------------------<  120<H>  2.9<LL>   |  25  |  0.62    Ca    8.4<L>      13 Jul 2023 08:20  Phos  3.3     07-13  Mg     1.6     07-13    TPro  6.7  /  Alb  2.4<L>  /  TBili  1.5<H>  /  DBili  x   /  AST  94<H>  /  ALT  154<H>  /  AlkPhos  173<H>  07-13      PT/INR - ( 12 Jul 2023 03:38 )   PT: 11.8 sec;   INR: 1.02 ratio

## 2023-07-13 NOTE — PROGRESS NOTE ADULT - PROBLEM SELECTOR PLAN 1
-   - NPO p MN  - Robotic cholecystectomy today - Adv diet to low fat  - Replete electrolytes   - Pain is controlled  - D/C Abx & IV lock  - PT consult  - DVT ppx

## 2023-07-13 NOTE — PROGRESS NOTE ADULT - ASSESSMENT
68 yo F presented with choledocholithiasis s/p ERCP and sphincterotomy/stone/sludge removal and stent placement 7/10.    P:  Pain control PRN  Routine vitals  Incentive spirometry  Low fat diet  GI ppx  Replete electrolytes PRN  DVT ppx  OOBC, ambulation  Rest of management as per primary team    To be discussed with Dr. Sosa.       68 yo F presented with choledocholithiasis s/p ERCP and sphincterotomy/stone/sludge removal and stent placement 7/10, now s/p Hardik. cholecystectomy     P:  Pain control PRN  Routine vitals  Incentive spirometry  Low fat diet  GI ppx  Replete electrolytes PRN  DVT ppx  OOBC, ambulation  Rest of management as per primary team    To be discussed with Dr. Sosa.

## 2023-07-14 ENCOUNTER — TRANSCRIPTION ENCOUNTER (OUTPATIENT)
Age: 68
End: 2023-07-14

## 2023-07-14 VITALS
DIASTOLIC BLOOD PRESSURE: 80 MMHG | RESPIRATION RATE: 17 BRPM | HEART RATE: 71 BPM | TEMPERATURE: 98 F | OXYGEN SATURATION: 97 % | SYSTOLIC BLOOD PRESSURE: 156 MMHG

## 2023-07-14 LAB
ADD ON TEST-SPECIMEN IN LAB: SIGNIFICANT CHANGE UP
ALBUMIN SERPL ELPH-MCNC: 2.7 G/DL — LOW (ref 3.3–5)
ALP SERPL-CCNC: 181 U/L — HIGH (ref 40–120)
ALT FLD-CCNC: 178 U/L — HIGH (ref 12–78)
ANION GAP SERPL CALC-SCNC: 4 MMOL/L — LOW (ref 5–17)
AST SERPL-CCNC: 117 U/L — HIGH (ref 15–37)
BILIRUB DIRECT SERPL-MCNC: 0.7 MG/DL — HIGH (ref 0–0.3)
BILIRUB INDIRECT FLD-MCNC: 0.7 MG/DL — SIGNIFICANT CHANGE UP (ref 0.2–1)
BILIRUB SERPL-MCNC: 1.4 MG/DL — HIGH (ref 0.2–1.2)
BILIRUB SERPL-MCNC: 1.4 MG/DL — HIGH (ref 0.2–1.2)
BUN SERPL-MCNC: 9 MG/DL — SIGNIFICANT CHANGE UP (ref 7–23)
CALCIUM SERPL-MCNC: 9.4 MG/DL — SIGNIFICANT CHANGE UP (ref 8.5–10.1)
CHLORIDE SERPL-SCNC: 106 MMOL/L — SIGNIFICANT CHANGE UP (ref 96–108)
CO2 SERPL-SCNC: 26 MMOL/L — SIGNIFICANT CHANGE UP (ref 22–31)
CREAT SERPL-MCNC: 0.72 MG/DL — SIGNIFICANT CHANGE UP (ref 0.5–1.3)
EGFR: 92 ML/MIN/1.73M2 — SIGNIFICANT CHANGE UP
GLUCOSE SERPL-MCNC: 107 MG/DL — HIGH (ref 70–99)
HCT VFR BLD CALC: 36.6 % — SIGNIFICANT CHANGE UP (ref 34.5–45)
HGB BLD-MCNC: 12.6 G/DL — SIGNIFICANT CHANGE UP (ref 11.5–15.5)
MCHC RBC-ENTMCNC: 30.3 PG — SIGNIFICANT CHANGE UP (ref 27–34)
MCHC RBC-ENTMCNC: 34.4 GM/DL — SIGNIFICANT CHANGE UP (ref 32–36)
MCV RBC AUTO: 88 FL — SIGNIFICANT CHANGE UP (ref 80–100)
PLATELET # BLD AUTO: 334 K/UL — SIGNIFICANT CHANGE UP (ref 150–400)
POTASSIUM SERPL-MCNC: 4.3 MMOL/L — SIGNIFICANT CHANGE UP (ref 3.5–5.3)
POTASSIUM SERPL-SCNC: 4.3 MMOL/L — SIGNIFICANT CHANGE UP (ref 3.5–5.3)
PROT SERPL-MCNC: 7.8 GM/DL — SIGNIFICANT CHANGE UP (ref 6–8.3)
RBC # BLD: 4.16 M/UL — SIGNIFICANT CHANGE UP (ref 3.8–5.2)
RBC # FLD: 13.2 % — SIGNIFICANT CHANGE UP (ref 10.3–14.5)
SODIUM SERPL-SCNC: 136 MMOL/L — SIGNIFICANT CHANGE UP (ref 135–145)
WBC # BLD: 11.26 K/UL — HIGH (ref 3.8–10.5)
WBC # FLD AUTO: 11.26 K/UL — HIGH (ref 3.8–10.5)

## 2023-07-14 PROCEDURE — 99239 HOSP IP/OBS DSCHRG MGMT >30: CPT

## 2023-07-14 RX ORDER — AMLODIPINE BESYLATE 2.5 MG/1
1 TABLET ORAL
Qty: 30 | Refills: 0
Start: 2023-07-14

## 2023-07-14 RX ORDER — POLYETHYLENE GLYCOL 3350 17 G/17G
17 POWDER, FOR SOLUTION ORAL
Qty: 0 | Refills: 0 | DISCHARGE
Start: 2023-07-14

## 2023-07-14 RX ADMIN — Medication 10 MILLIGRAM(S): at 00:47

## 2023-07-14 RX ADMIN — AMLODIPINE BESYLATE 5 MILLIGRAM(S): 2.5 TABLET ORAL at 09:14

## 2023-07-14 RX ADMIN — Medication 25 MILLIGRAM(S): at 09:14

## 2023-07-14 RX ADMIN — LOSARTAN POTASSIUM 100 MILLIGRAM(S): 100 TABLET, FILM COATED ORAL at 09:14

## 2023-07-14 RX ADMIN — VORTIOXETINE 30 MILLIGRAM(S): 5 TABLET, FILM COATED ORAL at 09:14

## 2023-07-14 RX ADMIN — PANTOPRAZOLE SODIUM 40 MILLIGRAM(S): 20 TABLET, DELAYED RELEASE ORAL at 06:28

## 2023-07-14 RX ADMIN — HEPARIN SODIUM 5000 UNIT(S): 5000 INJECTION INTRAVENOUS; SUBCUTANEOUS at 06:28

## 2023-07-14 RX ADMIN — SODIUM CHLORIDE 3 MILLILITER(S): 9 INJECTION INTRAMUSCULAR; INTRAVENOUS; SUBCUTANEOUS at 05:51

## 2023-07-14 RX ADMIN — Medication 3 MILLIGRAM(S): at 00:23

## 2023-07-14 NOTE — PROGRESS NOTE ADULT - PROBLEM SELECTOR PLAN 1
- Adv diet to low fat  - Replete electrolytes   - Pain is controlled  - D/C Abx & IV lock  - PT consult  - DVT ppx

## 2023-07-14 NOTE — PROGRESS NOTE ADULT - SUBJECTIVE AND OBJECTIVE BOX
Pt. seen and examined at bedside this morning. Patient reports pain better. Denies fever, chest pain, SOB, nausea, vomiting. No acute events overnight.    PHYSICAL EXAM:  General: NAD.  HEENT: no JVD, slight jaundice.  LUNGS: normal effort, equal chest rise  Heart: S1 S2 RRR  Abd:  incisions without erythema or induration, abd nondistended, soft

## 2023-07-14 NOTE — DISCHARGE NOTE PROVIDER - HOSPITAL COURSE
66 y/o F with PMH HTN, HLD, DJD, GERD, anxiety/depression, HIRA not on CPAP (diagnosed 10 years ago), surgery for rectocele, s/p carpal tunnel surgery, s/p back surgery, s/p b/l breast augmentation and tummy tuck, s/p lap band presented to [facility] with abdominal pain. As per H&P from [place] pt had a large meal at 8:30 prior to admission and 5 hours later woke up with severe epigastric pain and nausea. She took stool softeners gas x and xanax without relief. The pain was described as sharp and constant with radiation to the right. CT abd/pelvis: thickened gallbladder wall with cholelithiasis and biliary ductal dilation, suggestive of acute cholecystitis in the appropriate clinical setting. Markedly thickened gastric fundus with focal outpouching concerning for gastritis or PUD. Lap bad in appropriate position.  Pt was admitted to [place]. Given Zosyn, IVF, and pain medications. She was seen by gastroenterology for increasing LFTs and probable cholangitis. She was started on Zosyn. Pt had a 5F by 4 cm pancreatic stent placed. However, she was transferred to  for ERCP by Dr. Lee.   She underwent EUS/ERCP with sphincterotomy/stent placement and stone extraction 7/10.   She subsequetnly underwent lap aric 7/12.   Post op course unremarkable. Diet was advanced. She is doing well and would like to go home asap.   Of note, she was started on norvasc d/t uncontrolled htn while here. She will f/u with her pcp as outpt for further mx of htn.   she is also advised otupt f/u with surgery and GI.     Vital Signs Last 24 Hrs  T(C): 36.8 (07-14-23 @ 08:15), Max: 37 (07-13-23 @ 20:03)  T(F): 98.2 (07-14-23 @ 08:15), Max: 98.6 (07-13-23 @ 20:03)  HR: 71 (07-14-23 @ 08:15) (59 - 71)  BP: 156/80 (07-14-23 @ 08:15) (135/68 - 170/83)  RR: 17 (07-14-23 @ 08:15) (17 - 18)  SpO2: 97% (07-14-23 @ 08:15) (94% - 97%)    PHYSICAL EXAM:    GENERAL: Comfortable, no acute distress   HEAD:  Normocephalic, atraumatic  EYES: EOMI, PERRLA  HEENT: Moist mucous membranes  NECK: Supple, No JVD  NERVOUS SYSTEM:  Alert & Oriented X3, Motor Strength 5/5 B/L upper and lower extremities  CHEST/LUNG: Clear to auscultation bilaterally  HEART: Regular rate and rhythm  ABDOMEN: Soft, Nontender, Nondistended, Bowel sounds present, lap sites intact with dermabond  GENITOURINARY: Voiding, no palpable bladder  EXTREMITIES:   No clubbing, cyanosis, or edema  MUSCULOSKELETAL- No muscle tenderness, no joint tenderness  SKIN-no rash      LABS:                        12.6   11.26 )-----------( 334      ( 14 Jul 2023 08:05 )             36.6     07-14    136  |  106  |  9   ----------------------------<  107<H>  4.3   |  26  |  0.72    Ca    9.4      14 Jul 2023 08:05  Phos  3.3     07-13  Mg     1.6     07-13    TPro  7.8  /  Alb  2.7<L>  /  TBili  1.4<H>  /  DBili  0.7<H>  /  AST  117<H>  /  ALT  178<H>  /  AlkPhos  181<H>  07-14    FINAL DIAGNOSIS:  1. Acute gallstone cholecystitis with choledocholithiasis S/P ERCP/STENT/LAP ARIC  2. HYPOKALEMIA  3. UNCONTROLLED HTN  4. HLD    TIME TAKEN FOR DC 42 MIN  D/W PT  SUMMARY TO BE FAXED TO PCP

## 2023-07-14 NOTE — DISCHARGE NOTE PROVIDER - NSDCCPCAREPLAN_GEN_ALL_CORE_FT
PRINCIPAL DISCHARGE DIAGNOSIS  Diagnosis: Choledocholithiasis  Assessment and Plan of Treatment: YOU UNDERWENT AN ERCP AND STENT PLACEMENT  FOLLOW UP WITH GI FOR FURTHER MANAGEMENT.   YOU ALSO UNDERWENT A CHOLECYSTECTOMY. CAN USE TYLENOL FOR PAIN (CAN TAKE UP TO 3000MG IN 24 HOURS)  FOLLOW UP WITH SURGERY IN 2 WEEKS

## 2023-07-14 NOTE — DISCHARGE NOTE PROVIDER - CARE PROVIDER_API CALL
John Sosa  Surgery  [address], [address]  [place] [address]  Phone: [phone]  Fax: [phone]  Follow Up Time: 2 weeks    Sukhdev Lee  Gastroenterology  [address] [place] [address]  Phone: [phone]  Fax: [phone]  Follow Up Time: 2 weeks

## 2023-07-14 NOTE — DISCHARGE NOTE PROVIDER - PROVIDER TOKENS
PROVIDER:[TOKEN:[[id number]],FOLLOWUP:[2 weeks]],PROVIDER:[TOKEN:[[id number]],FOLLOWUP:[2 weeks]]

## 2023-07-14 NOTE — PROGRESS NOTE ADULT - ASSESSMENT
68 yo F presented with choledocholithiasis s/p ERCP and sphincterotomy/stone/sludge removal and stent placement 7/10, now s/p Hardik. cholecystectomy     P:  Pain control PRN  Routine vitals  Incentive spirometry  Low fat diet  GI ppx  Replete electrolytes PRN  DVT ppx  OOBC, ambulation  Follow AM labs  Rest of management as per primary team    To be discussed with Dr. Sosa.

## 2023-07-14 NOTE — DISCHARGE NOTE NURSING/CASE MANAGEMENT/SOCIAL WORK - NSDCPEFALRISK_GEN_ALL_CORE
For information on Fall & Injury Prevention, visit: [email or web address] OR  [email or web address] OR  https://www.cdc.gov/steadi/patient.html

## 2023-07-14 NOTE — DISCHARGE NOTE PROVIDER - NSDCMRMEDTOKEN_GEN_ALL_CORE_FT
atorvastatin 10 mg oral tablet:  orally once a day  dextroamphetamine-amphetamine 15 mg oral capsule, extended release: 1 tab(s) orally 2 times a day  losartan-hydroCHLOROthiazide 100 mg-25 mg oral tablet: 1 tab(s) orally once a day  metoprolol succinate 25 mg oral tablet, extended release:  orally 2 times a day  Norvasc 5 mg oral tablet: 1 tab(s) orally once a day  omeprazole 40 mg oral delayed release capsule: 1 tab(s) orally once a day  polyethylene glycol 3350 oral powder for reconstitution: 17 gram(s) orally once a day  Trintellix 10 mg oral tablet: 3 tab(s) orally once a day  Xanax 2 mg oral tablet: 1 tab(s) orally 3 times a day as needed for  anxiety

## 2023-07-15 LAB
CULTURE RESULTS: SIGNIFICANT CHANGE UP
CULTURE RESULTS: SIGNIFICANT CHANGE UP
SPECIMEN SOURCE: SIGNIFICANT CHANGE UP
SPECIMEN SOURCE: SIGNIFICANT CHANGE UP

## 2023-07-18 LAB — SURGICAL PATHOLOGY STUDY: SIGNIFICANT CHANGE UP

## 2023-07-19 PROBLEM — G47.33 OBSTRUCTIVE SLEEP APNEA (ADULT) (PEDIATRIC): Chronic | Status: ACTIVE | Noted: 2023-07-10

## 2023-07-26 DIAGNOSIS — I10 ESSENTIAL (PRIMARY) HYPERTENSION: ICD-10-CM

## 2023-07-26 DIAGNOSIS — E78.5 HYPERLIPIDEMIA, UNSPECIFIED: ICD-10-CM

## 2023-07-26 DIAGNOSIS — K21.9 GASTRO-ESOPHAGEAL REFLUX DISEASE WITHOUT ESOPHAGITIS: ICD-10-CM

## 2023-07-26 DIAGNOSIS — D64.9 ANEMIA, UNSPECIFIED: ICD-10-CM

## 2023-07-26 DIAGNOSIS — F32.A DEPRESSION, UNSPECIFIED: ICD-10-CM

## 2023-07-26 DIAGNOSIS — K44.9 DIAPHRAGMATIC HERNIA WITHOUT OBSTRUCTION OR GANGRENE: ICD-10-CM

## 2023-07-26 DIAGNOSIS — K80.63 CALCULUS OF GALLBLADDER AND BILE DUCT WITH ACUTE CHOLECYSTITIS WITH OBSTRUCTION: ICD-10-CM

## 2023-07-26 DIAGNOSIS — E87.6 HYPOKALEMIA: ICD-10-CM

## 2023-07-26 DIAGNOSIS — G47.33 OBSTRUCTIVE SLEEP APNEA (ADULT) (PEDIATRIC): ICD-10-CM

## 2023-07-26 DIAGNOSIS — F41.9 ANXIETY DISORDER, UNSPECIFIED: ICD-10-CM

## 2023-07-26 DIAGNOSIS — Z98.1 ARTHRODESIS STATUS: ICD-10-CM

## 2023-07-26 DIAGNOSIS — M19.09 PRIMARY OSTEOARTHRITIS, OTHER SPECIFIED SITE: ICD-10-CM

## 2023-07-26 DIAGNOSIS — R73.9 HYPERGLYCEMIA, UNSPECIFIED: ICD-10-CM

## 2023-08-09 ENCOUNTER — APPOINTMENT (OUTPATIENT)
Dept: OBGYN | Facility: CLINIC | Age: 68
End: 2023-08-09
Payer: MEDICARE

## 2023-08-09 VITALS
DIASTOLIC BLOOD PRESSURE: 77 MMHG | SYSTOLIC BLOOD PRESSURE: 119 MMHG | HEIGHT: 59 IN | HEART RATE: 71 BPM | BODY MASS INDEX: 30.44 KG/M2 | WEIGHT: 151 LBS

## 2023-08-09 DIAGNOSIS — Z01.419 ENCOUNTER FOR GYNECOLOGICAL EXAMINATION (GENERAL) (ROUTINE) W/OUT ABNORMAL FINDINGS: ICD-10-CM

## 2023-08-09 DIAGNOSIS — N95.9 UNSPECIFIED MENOPAUSAL AND PERIMENOPAUSAL DISORDER: ICD-10-CM

## 2023-08-09 DIAGNOSIS — N90.89 OTHER SPECIFIED NONINFLAMMATORY DISORDERS OF VULVA AND PERINEUM: ICD-10-CM

## 2023-08-09 DIAGNOSIS — N90.9 NONINFLAMMATORY DISORDER OF VULVA AND PERINEUM, UNSPECIFIED: ICD-10-CM

## 2023-08-09 PROCEDURE — G0101: CPT

## 2023-08-09 NOTE — PHYSICAL EXAM
[Appropriately responsive] : appropriately responsive [Alert] : alert [No Acute Distress] : no acute distress [Soft] : soft [Non-tender] : non-tender [Non-distended] : non-distended [No HSM] : No HSM [No Lesions] : no lesions [No Mass] : no mass [Oriented x3] : oriented x3 [Examination Of The Breasts] : a normal appearance [] : implants [No Masses] : no breast masses were palpable [Labia Majora] : normal [Labia Minora] : normal [Normal] : normal [Chaperone Present] : A chaperone was present in the examining room during all aspects of the physical examination [No Lymphadenopathy] : no lymphadenopathy [Uterine Adnexae] : non-palpable

## 2023-08-09 NOTE — HISTORY OF PRESENT ILLNESS
[TextBox_4] : lives alone with sick cat\par  pap 2019,Lance pt--h./o HPV--wants DOV pap,mammo 2020,colon 2016,dexa 2017 [Mammogramdate] : emr [BreastSonogramDate] : emr [PapSmeardate] : emr [BoneDensityDate] : emr [ColonoscopyDate] : emr

## 2023-08-10 LAB — HPV HIGH+LOW RISK DNA PNL CVX: NOT DETECTED

## 2023-08-13 LAB — CYTOLOGY CVX/VAG DOC THIN PREP: NORMAL

## 2023-08-30 ENCOUNTER — APPOINTMENT (OUTPATIENT)
Dept: GASTROENTEROLOGY | Facility: CLINIC | Age: 68
End: 2023-08-30
Payer: MEDICARE

## 2023-08-30 VITALS
WEIGHT: 151 LBS | SYSTOLIC BLOOD PRESSURE: 124 MMHG | HEIGHT: 59 IN | DIASTOLIC BLOOD PRESSURE: 77 MMHG | HEART RATE: 66 BPM | BODY MASS INDEX: 30.44 KG/M2

## 2023-08-30 DIAGNOSIS — Z09 ENCOUNTER FOR FOLLOW-UP EXAMINATION AFTER COMPLETED TREATMENT FOR CONDITIONS OTHER THAN MALIGNANT NEOPLASM: ICD-10-CM

## 2023-08-30 PROCEDURE — 99214 OFFICE O/P EST MOD 30 MIN: CPT

## 2023-09-05 NOTE — PHYSICAL EXAM
[Alert] : alert [Normal Voice/Communication] : normal voice/communication [Healthy Appearing] : healthy appearing [No Acute Distress] : no acute distress [Sclera] : the sclera and conjunctiva were normal [Hearing Threshold Finger Rub Not [place]] : hearing was normal [Normal Lips/Gums] : the lips and gums were normal [Oropharynx] : the oropharynx was normal [Normal Appearance] : the appearance of the neck was normal [No Neck Mass] : no neck mass was observed [Abdomen Tenderness] : non-tender [Abdomen Soft] : soft [Abnormal Walk] : normal gait [Motor Tone] : muscle strength and tone were normal [Normal Color / Pigmentation] : normal skin color and pigmentation [] : no rash [Skin Lesions] : no skin lesions [No Focal Deficits] : no focal deficits [Motor Exam] : the motor exam was normal [Oriented To Time, Place, And Person] : oriented to person, place, and time

## 2023-09-05 NOTE — ASSESSMENT
[FreeTextEntry1] : 68 year old woman s/p ERCP and lap cholecystectomy, here to discuss stent removal.   Patient feeling well.  Will book ERCP with stent removal. All questions answered.

## 2023-09-05 NOTE — HISTORY OF PRESENT ILLNESS
[FreeTextEntry1] : 68 year old woman s/p ERCP and lap cholecystectomy, here to discuss stent removal.   Patient was admitted to [facility] for choledocholithaisis; failed ERCP there, challenged and PD stent placed. Got transferred to [place] and performed ERCP (needed to use needle knife, stented). Since procedure patient is doing well. No abdominal pain. No post prandial symptoms. No weight loss. No overt signs of GI bleeding like hematemesis, melena, hematochezia. Good appetite. No weight loss. No jaundice. Eating well.

## 2023-11-01 ENCOUNTER — RX RENEWAL (OUTPATIENT)
Age: 68
End: 2023-11-01

## 2023-11-01 RX ORDER — ESTRADIOL 0.1 MG/G
0.1 CREAM VAGINAL
Qty: 85 | Refills: 2 | Status: ACTIVE | COMMUNITY
Start: 2019-05-07 | End: 1900-01-01

## 2023-11-17 ENCOUNTER — OUTPATIENT (OUTPATIENT)
Dept: OUTPATIENT SERVICES | Facility: HOSPITAL | Age: 68
LOS: 1 days | End: 2023-11-17
Payer: MEDICARE

## 2023-11-17 VITALS
TEMPERATURE: 98 F | HEIGHT: 59 IN | OXYGEN SATURATION: 98 % | RESPIRATION RATE: 18 BRPM | HEART RATE: 64 BPM | SYSTOLIC BLOOD PRESSURE: 117 MMHG | WEIGHT: 152.12 LBS | DIASTOLIC BLOOD PRESSURE: 61 MMHG

## 2023-11-17 DIAGNOSIS — Z96.89 PRESENCE OF OTHER SPECIFIED FUNCTIONAL IMPLANTS: ICD-10-CM

## 2023-11-17 DIAGNOSIS — Z90.49 ACQUIRED ABSENCE OF OTHER SPECIFIED PARTS OF DIGESTIVE TRACT: Chronic | ICD-10-CM

## 2023-11-17 DIAGNOSIS — Z98.89 OTHER SPECIFIED POSTPROCEDURAL STATES: Chronic | ICD-10-CM

## 2023-11-17 DIAGNOSIS — Z98.890 OTHER SPECIFIED POSTPROCEDURAL STATES: Chronic | ICD-10-CM

## 2023-11-17 DIAGNOSIS — Z98.82 BREAST IMPLANT STATUS: Chronic | ICD-10-CM

## 2023-11-17 DIAGNOSIS — Z41.9 ENCOUNTER FOR PROCEDURE FOR PURPOSES OTHER THAN REMEDYING HEALTH STATE, UNSPECIFIED: Chronic | ICD-10-CM

## 2023-11-17 DIAGNOSIS — Z98.84 BARIATRIC SURGERY STATUS: Chronic | ICD-10-CM

## 2023-11-17 DIAGNOSIS — Z98.1 ARTHRODESIS STATUS: Chronic | ICD-10-CM

## 2023-11-17 LAB
ANION GAP SERPL CALC-SCNC: 3 MMOL/L — LOW (ref 5–17)
ANION GAP SERPL CALC-SCNC: 3 MMOL/L — LOW (ref 5–17)
APTT BLD: 31.1 SEC — SIGNIFICANT CHANGE UP (ref 24.5–35.6)
APTT BLD: 31.1 SEC — SIGNIFICANT CHANGE UP (ref 24.5–35.6)
BASOPHILS # BLD AUTO: 0.07 K/UL — SIGNIFICANT CHANGE UP (ref 0–0.2)
BASOPHILS # BLD AUTO: 0.07 K/UL — SIGNIFICANT CHANGE UP (ref 0–0.2)
BASOPHILS NFR BLD AUTO: 1 % — SIGNIFICANT CHANGE UP (ref 0–2)
BASOPHILS NFR BLD AUTO: 1 % — SIGNIFICANT CHANGE UP (ref 0–2)
BUN SERPL-MCNC: 24 MG/DL — HIGH (ref 7–23)
BUN SERPL-MCNC: 24 MG/DL — HIGH (ref 7–23)
CALCIUM SERPL-MCNC: 9.2 MG/DL — SIGNIFICANT CHANGE UP (ref 8.5–10.1)
CALCIUM SERPL-MCNC: 9.2 MG/DL — SIGNIFICANT CHANGE UP (ref 8.5–10.1)
CHLORIDE SERPL-SCNC: 105 MMOL/L — SIGNIFICANT CHANGE UP (ref 96–108)
CHLORIDE SERPL-SCNC: 105 MMOL/L — SIGNIFICANT CHANGE UP (ref 96–108)
CO2 SERPL-SCNC: 30 MMOL/L — SIGNIFICANT CHANGE UP (ref 22–31)
CO2 SERPL-SCNC: 30 MMOL/L — SIGNIFICANT CHANGE UP (ref 22–31)
CREAT SERPL-MCNC: 0.93 MG/DL — SIGNIFICANT CHANGE UP (ref 0.5–1.3)
CREAT SERPL-MCNC: 0.93 MG/DL — SIGNIFICANT CHANGE UP (ref 0.5–1.3)
EGFR: 67 ML/MIN/1.73M2 — SIGNIFICANT CHANGE UP
EGFR: 67 ML/MIN/1.73M2 — SIGNIFICANT CHANGE UP
EOSINOPHIL # BLD AUTO: 0.1 K/UL — SIGNIFICANT CHANGE UP (ref 0–0.5)
EOSINOPHIL # BLD AUTO: 0.1 K/UL — SIGNIFICANT CHANGE UP (ref 0–0.5)
EOSINOPHIL NFR BLD AUTO: 1.5 % — SIGNIFICANT CHANGE UP (ref 0–6)
EOSINOPHIL NFR BLD AUTO: 1.5 % — SIGNIFICANT CHANGE UP (ref 0–6)
GLUCOSE SERPL-MCNC: 116 MG/DL — HIGH (ref 70–99)
GLUCOSE SERPL-MCNC: 116 MG/DL — HIGH (ref 70–99)
HCT VFR BLD CALC: 35.4 % — SIGNIFICANT CHANGE UP (ref 34.5–45)
HCT VFR BLD CALC: 35.4 % — SIGNIFICANT CHANGE UP (ref 34.5–45)
HGB BLD-MCNC: 12.2 G/DL — SIGNIFICANT CHANGE UP (ref 11.5–15.5)
HGB BLD-MCNC: 12.2 G/DL — SIGNIFICANT CHANGE UP (ref 11.5–15.5)
IMM GRANULOCYTES NFR BLD AUTO: 0.1 % — SIGNIFICANT CHANGE UP (ref 0–0.9)
IMM GRANULOCYTES NFR BLD AUTO: 0.1 % — SIGNIFICANT CHANGE UP (ref 0–0.9)
INR BLD: 0.86 RATIO — SIGNIFICANT CHANGE UP (ref 0.85–1.18)
INR BLD: 0.86 RATIO — SIGNIFICANT CHANGE UP (ref 0.85–1.18)
LYMPHOCYTES # BLD AUTO: 2.63 K/UL — SIGNIFICANT CHANGE UP (ref 1–3.3)
LYMPHOCYTES # BLD AUTO: 2.63 K/UL — SIGNIFICANT CHANGE UP (ref 1–3.3)
LYMPHOCYTES # BLD AUTO: 38.2 % — SIGNIFICANT CHANGE UP (ref 13–44)
LYMPHOCYTES # BLD AUTO: 38.2 % — SIGNIFICANT CHANGE UP (ref 13–44)
MCHC RBC-ENTMCNC: 29.9 PG — SIGNIFICANT CHANGE UP (ref 27–34)
MCHC RBC-ENTMCNC: 29.9 PG — SIGNIFICANT CHANGE UP (ref 27–34)
MCHC RBC-ENTMCNC: 34.5 GM/DL — SIGNIFICANT CHANGE UP (ref 32–36)
MCHC RBC-ENTMCNC: 34.5 GM/DL — SIGNIFICANT CHANGE UP (ref 32–36)
MCV RBC AUTO: 86.8 FL — SIGNIFICANT CHANGE UP (ref 80–100)
MCV RBC AUTO: 86.8 FL — SIGNIFICANT CHANGE UP (ref 80–100)
MONOCYTES # BLD AUTO: 0.38 K/UL — SIGNIFICANT CHANGE UP (ref 0–0.9)
MONOCYTES # BLD AUTO: 0.38 K/UL — SIGNIFICANT CHANGE UP (ref 0–0.9)
MONOCYTES NFR BLD AUTO: 5.5 % — SIGNIFICANT CHANGE UP (ref 2–14)
MONOCYTES NFR BLD AUTO: 5.5 % — SIGNIFICANT CHANGE UP (ref 2–14)
NEUTROPHILS # BLD AUTO: 3.69 K/UL — SIGNIFICANT CHANGE UP (ref 1.8–7.4)
NEUTROPHILS # BLD AUTO: 3.69 K/UL — SIGNIFICANT CHANGE UP (ref 1.8–7.4)
NEUTROPHILS NFR BLD AUTO: 53.7 % — SIGNIFICANT CHANGE UP (ref 43–77)
NEUTROPHILS NFR BLD AUTO: 53.7 % — SIGNIFICANT CHANGE UP (ref 43–77)
PLATELET # BLD AUTO: 217 K/UL — SIGNIFICANT CHANGE UP (ref 150–400)
PLATELET # BLD AUTO: 217 K/UL — SIGNIFICANT CHANGE UP (ref 150–400)
POTASSIUM SERPL-MCNC: 4.2 MMOL/L — SIGNIFICANT CHANGE UP (ref 3.5–5.3)
POTASSIUM SERPL-MCNC: 4.2 MMOL/L — SIGNIFICANT CHANGE UP (ref 3.5–5.3)
POTASSIUM SERPL-SCNC: 4.2 MMOL/L — SIGNIFICANT CHANGE UP (ref 3.5–5.3)
POTASSIUM SERPL-SCNC: 4.2 MMOL/L — SIGNIFICANT CHANGE UP (ref 3.5–5.3)
PROTHROM AB SERPL-ACNC: 9.8 SEC — SIGNIFICANT CHANGE UP (ref 9.5–13)
PROTHROM AB SERPL-ACNC: 9.8 SEC — SIGNIFICANT CHANGE UP (ref 9.5–13)
RBC # BLD: 4.08 M/UL — SIGNIFICANT CHANGE UP (ref 3.8–5.2)
RBC # BLD: 4.08 M/UL — SIGNIFICANT CHANGE UP (ref 3.8–5.2)
RBC # FLD: 12.7 % — SIGNIFICANT CHANGE UP (ref 10.3–14.5)
RBC # FLD: 12.7 % — SIGNIFICANT CHANGE UP (ref 10.3–14.5)
SODIUM SERPL-SCNC: 138 MMOL/L — SIGNIFICANT CHANGE UP (ref 135–145)
SODIUM SERPL-SCNC: 138 MMOL/L — SIGNIFICANT CHANGE UP (ref 135–145)
WBC # BLD: 6.88 K/UL — SIGNIFICANT CHANGE UP (ref 3.8–10.5)
WBC # BLD: 6.88 K/UL — SIGNIFICANT CHANGE UP (ref 3.8–10.5)
WBC # FLD AUTO: 6.88 K/UL — SIGNIFICANT CHANGE UP (ref 3.8–10.5)
WBC # FLD AUTO: 6.88 K/UL — SIGNIFICANT CHANGE UP (ref 3.8–10.5)

## 2023-11-17 PROCEDURE — 85730 THROMBOPLASTIN TIME PARTIAL: CPT

## 2023-11-17 PROCEDURE — 93010 ELECTROCARDIOGRAM REPORT: CPT

## 2023-11-17 PROCEDURE — 36415 COLL VENOUS BLD VENIPUNCTURE: CPT

## 2023-11-17 PROCEDURE — 85025 COMPLETE CBC W/AUTO DIFF WBC: CPT

## 2023-11-17 PROCEDURE — 80048 BASIC METABOLIC PNL TOTAL CA: CPT

## 2023-11-17 PROCEDURE — 99214 OFFICE O/P EST MOD 30 MIN: CPT | Mod: 25

## 2023-11-17 PROCEDURE — 93005 ELECTROCARDIOGRAM TRACING: CPT

## 2023-11-17 PROCEDURE — 85610 PROTHROMBIN TIME: CPT

## 2023-11-17 NOTE — H&P PST ADULT - HISTORY OF PRESENT ILLNESS
This is a 69 y/o female with a PMH of HTN, HLD, depression, anxiety who had a cholecystectomy 7/2023. now scheudled for an ERCP and removal of stent. Denies any N/V/D, constipation, abdomial pain, SOB, chest pain or palpations.  This is a 69 y/o female with a PMH of HTN, HLD, HIRA, depression, and anxiety who had a cholecystectomy and stent placed in her CBD 7/2023. She is now scheduled for an ERCP and removal of stent. Denies any N/V/D, constipation, abdominal pain, SOB, chest pain or palpations.

## 2023-11-17 NOTE — H&P PST ADULT - NSICDXPASTSURGICALHX_GEN_ALL_CORE_FT
PAST SURGICAL HISTORY:  Carpal Tunnel Syndrome Bilateral hands (2013)    Cataract Bilateral eyes (2010)    Elective surgery S/p Lap Band (2013)    H/O breast augmentation     H/O laparoscopic adjustable gastric banding     H/O medial meniscus repair of left knee     H/O medial meniscus repair of right knee     History of cholecystectomy     History of colonoscopy (2007)    Rectocele (2007)    S/P endoscopy (2008)    S/P lumbar fusion L4-L5 lumbar fusion (2008)

## 2023-11-17 NOTE — H&P PST ADULT - MAMMOGRAM, RESULTS OF LAST, PROFILE
negative  (is now going for [facility] mammogram on 11/20/2023 after a Right breast mass was found)

## 2023-11-17 NOTE — H&P PST ADULT - ASSESSMENT
This is a 73 y/o female who is scheduled for an ERCP with CBD stent to be pulled    Plan    1. NPO after midnight  2. Patient instructed to follow instructions from Dr. Lee for bowel prep and day of procedure medications

## 2023-11-18 DIAGNOSIS — Z96.89 PRESENCE OF OTHER SPECIFIED FUNCTIONAL IMPLANTS: ICD-10-CM

## 2023-11-20 ENCOUNTER — APPOINTMENT (OUTPATIENT)
Dept: MAMMOGRAPHY | Facility: IMAGING CENTER | Age: 68
End: 2023-11-20
Payer: MEDICARE

## 2023-11-20 ENCOUNTER — APPOINTMENT (OUTPATIENT)
Dept: ULTRASOUND IMAGING | Facility: IMAGING CENTER | Age: 68
End: 2023-11-20
Payer: MEDICARE

## 2023-11-20 ENCOUNTER — OUTPATIENT (OUTPATIENT)
Dept: OUTPATIENT SERVICES | Facility: HOSPITAL | Age: 68
LOS: 1 days | End: 2023-11-20
Payer: MEDICARE

## 2023-11-20 DIAGNOSIS — Z90.49 ACQUIRED ABSENCE OF OTHER SPECIFIED PARTS OF DIGESTIVE TRACT: Chronic | ICD-10-CM

## 2023-11-20 DIAGNOSIS — Z00.8 ENCOUNTER FOR OTHER GENERAL EXAMINATION: ICD-10-CM

## 2023-11-20 DIAGNOSIS — Z98.890 OTHER SPECIFIED POSTPROCEDURAL STATES: Chronic | ICD-10-CM

## 2023-11-20 DIAGNOSIS — Z80.3 FAMILY HISTORY OF MALIGNANT NEOPLASM OF BREAST: ICD-10-CM

## 2023-11-20 DIAGNOSIS — Z41.9 ENCOUNTER FOR PROCEDURE FOR PURPOSES OTHER THAN REMEDYING HEALTH STATE, UNSPECIFIED: Chronic | ICD-10-CM

## 2023-11-20 DIAGNOSIS — Z98.1 ARTHRODESIS STATUS: Chronic | ICD-10-CM

## 2023-11-20 DIAGNOSIS — Z98.84 BARIATRIC SURGERY STATUS: Chronic | ICD-10-CM

## 2023-11-20 DIAGNOSIS — Z98.82 BREAST IMPLANT STATUS: Chronic | ICD-10-CM

## 2023-11-20 DIAGNOSIS — Z98.89 OTHER SPECIFIED POSTPROCEDURAL STATES: Chronic | ICD-10-CM

## 2023-11-20 DIAGNOSIS — R92.2 INCONCLUSIVE MAMMOGRAM: ICD-10-CM

## 2023-11-20 PROCEDURE — G0279: CPT | Mod: 26

## 2023-11-20 PROCEDURE — 77065 DX MAMMO INCL CAD UNI: CPT | Mod: 26,RT

## 2023-11-20 PROCEDURE — 77065 DX MAMMO INCL CAD UNI: CPT

## 2023-11-20 PROCEDURE — 76641 ULTRASOUND BREAST COMPLETE: CPT | Mod: 26,RT,GY

## 2023-11-20 PROCEDURE — G0279: CPT

## 2023-11-20 PROCEDURE — 76641 ULTRASOUND BREAST COMPLETE: CPT

## 2023-12-08 ENCOUNTER — APPOINTMENT (OUTPATIENT)
Dept: GASTROENTEROLOGY | Facility: HOSPITAL | Age: 68
End: 2023-12-08
Payer: MEDICARE

## 2023-12-08 ENCOUNTER — OUTPATIENT (OUTPATIENT)
Dept: INPATIENT UNIT | Facility: HOSPITAL | Age: 68
LOS: 1 days | Discharge: HOME | End: 2023-12-08
Payer: MEDICARE

## 2023-12-08 ENCOUNTER — TRANSCRIPTION ENCOUNTER (OUTPATIENT)
Age: 68
End: 2023-12-08

## 2023-12-08 ENCOUNTER — RESULT REVIEW (OUTPATIENT)
Age: 68
End: 2023-12-08

## 2023-12-08 VITALS
DIASTOLIC BLOOD PRESSURE: 60 MMHG | WEIGHT: 152.12 LBS | HEART RATE: 66 BPM | SYSTOLIC BLOOD PRESSURE: 126 MMHG | HEIGHT: 59 IN | RESPIRATION RATE: 16 BRPM | OXYGEN SATURATION: 100 % | TEMPERATURE: 99 F

## 2023-12-08 VITALS
RESPIRATION RATE: 13 BRPM | HEART RATE: 59 BPM | OXYGEN SATURATION: 96 % | SYSTOLIC BLOOD PRESSURE: 126 MMHG | DIASTOLIC BLOOD PRESSURE: 78 MMHG | TEMPERATURE: 98 F

## 2023-12-08 DIAGNOSIS — Z98.890 OTHER SPECIFIED POSTPROCEDURAL STATES: Chronic | ICD-10-CM

## 2023-12-08 DIAGNOSIS — Z96.89 PRESENCE OF OTHER SPECIFIED FUNCTIONAL IMPLANTS: ICD-10-CM

## 2023-12-08 DIAGNOSIS — Z98.89 OTHER SPECIFIED POSTPROCEDURAL STATES: Chronic | ICD-10-CM

## 2023-12-08 DIAGNOSIS — Z98.1 ARTHRODESIS STATUS: Chronic | ICD-10-CM

## 2023-12-08 DIAGNOSIS — Z90.49 ACQUIRED ABSENCE OF OTHER SPECIFIED PARTS OF DIGESTIVE TRACT: Chronic | ICD-10-CM

## 2023-12-08 DIAGNOSIS — Z98.84 BARIATRIC SURGERY STATUS: Chronic | ICD-10-CM

## 2023-12-08 DIAGNOSIS — Z98.82 BREAST IMPLANT STATUS: Chronic | ICD-10-CM

## 2023-12-08 DIAGNOSIS — Z41.9 ENCOUNTER FOR PROCEDURE FOR PURPOSES OTHER THAN REMEDYING HEALTH STATE, UNSPECIFIED: Chronic | ICD-10-CM

## 2023-12-08 PROCEDURE — 74330 X-RAY BILE/PANC ENDOSCOPY: CPT

## 2023-12-08 PROCEDURE — 43264 ERCP REMOVE DUCT CALCULI: CPT | Mod: GC

## 2023-12-08 PROCEDURE — 43239 EGD BIOPSY SINGLE/MULTIPLE: CPT | Mod: GC

## 2023-12-08 PROCEDURE — 88313 SPECIAL STAINS GROUP 2: CPT | Mod: 26

## 2023-12-08 PROCEDURE — 76000 FLUOROSCOPY <1 HR PHYS/QHP: CPT

## 2023-12-08 PROCEDURE — C1889: CPT

## 2023-12-08 PROCEDURE — 88305 TISSUE EXAM BY PATHOLOGIST: CPT | Mod: 26

## 2023-12-08 PROCEDURE — 43275 ERCP REMOVE FORGN BODY DUCT: CPT | Mod: GC

## 2023-12-08 PROCEDURE — 43262 ENDO CHOLANGIOPANCREATOGRAPH: CPT | Mod: GC

## 2023-12-08 PROCEDURE — 88313 SPECIAL STAINS GROUP 2: CPT

## 2023-12-08 PROCEDURE — C1769: CPT

## 2023-12-08 PROCEDURE — 88305 TISSUE EXAM BY PATHOLOGIST: CPT

## 2023-12-08 RX ORDER — ALPRAZOLAM 0.25 MG
1 TABLET ORAL
Refills: 0 | DISCHARGE

## 2023-12-08 RX ORDER — SODIUM CHLORIDE 9 MG/ML
1000 INJECTION, SOLUTION INTRAVENOUS
Refills: 0 | Status: DISCONTINUED | OUTPATIENT
Start: 2023-12-08 | End: 2023-12-08

## 2023-12-08 RX ORDER — ONDANSETRON 8 MG/1
4 TABLET, FILM COATED ORAL ONCE
Refills: 0 | Status: DISCONTINUED | OUTPATIENT
Start: 2023-12-08 | End: 2023-12-08

## 2023-12-08 RX ORDER — OMEPRAZOLE 10 MG/1
1 CAPSULE, DELAYED RELEASE ORAL
Refills: 0 | DISCHARGE

## 2023-12-08 RX ORDER — OXYCODONE HYDROCHLORIDE 5 MG/1
5 TABLET ORAL ONCE
Refills: 0 | Status: DISCONTINUED | OUTPATIENT
Start: 2023-12-08 | End: 2023-12-08

## 2023-12-08 RX ORDER — DEXTROAMPHETAMINE SACCHARATE, AMPHETAMINE ASPARTATE, DEXTROAMPHETAMINE SULFATE AND AMPHETAMINE SULFATE 1.875; 1.875; 1.875; 1.875 MG/1; MG/1; MG/1; MG/1
1 TABLET ORAL
Refills: 0 | DISCHARGE

## 2023-12-08 RX ORDER — FENTANYL CITRATE 50 UG/ML
50 INJECTION INTRAVENOUS
Refills: 0 | Status: DISCONTINUED | OUTPATIENT
Start: 2023-12-08 | End: 2023-12-08

## 2023-12-08 RX ORDER — LOSARTAN/HYDROCHLOROTHIAZIDE 100MG-25MG
1 TABLET ORAL
Refills: 0 | DISCHARGE

## 2023-12-08 RX ORDER — VORTIOXETINE 5 MG/1
3 TABLET, FILM COATED ORAL
Refills: 0 | DISCHARGE

## 2023-12-08 RX ADMIN — OXYCODONE HYDROCHLORIDE 5 MILLIGRAM(S): 5 TABLET ORAL at 16:08

## 2023-12-08 NOTE — ASU DISCHARGE PLAN (ADULT/PEDIATRIC) - NS MD DC FALL RISK RISK
For information on Fall & Injury Prevention, visit: [email or web address] OR  [email or web address] OR  https://www.cdc.gov/steadi/patient.html For information on Fall & Injury Prevention, visit: [email or web address] OR  [email or web address]://[email or web address] OR  https://www.cdc.gov/steadi/patient.html

## 2023-12-08 NOTE — ASU DISCHARGE PLAN (ADULT/PEDIATRIC) - DRIVING
Detox consult     Pt not in room.  came twice to see pt  chart reviewed  Continue MMTP you can drive tomorrow/No...

## 2023-12-08 NOTE — ASU PATIENT PROFILE, ADULT - FALL HARM RISK - UNIVERSAL INTERVENTIONS
Bed in lowest position, wheels locked, appropriate side rails in place/Call bell, personal items and telephone in reach/Instruct patient to call for assistance before getting out of bed or chair/Non-slip footwear when patient is out of bed/[place] to call system/Physically safe environment - no spills, clutter or unnecessary equipment/Purposeful Proactive Rounding/Room/bathroom lighting operational, light cord in reach Bed in lowest position, wheels locked, appropriate side rails in place/Call bell, personal items and telephone in reach/Instruct patient to call for assistance before getting out of bed or chair/Non-slip footwear when patient is out of bed/[place] to call system/Physically safe environment - no spills, clutter or unnecessary equipment/Purposeful Proactive Rounding/Room/bathroom lighting operational, light cord in reach

## 2023-12-12 LAB
SURGICAL PATHOLOGY STUDY: SIGNIFICANT CHANGE UP
SURGICAL PATHOLOGY STUDY: SIGNIFICANT CHANGE UP

## 2023-12-15 DIAGNOSIS — F32.A DEPRESSION, UNSPECIFIED: ICD-10-CM

## 2023-12-15 DIAGNOSIS — I10 ESSENTIAL (PRIMARY) HYPERTENSION: ICD-10-CM

## 2023-12-15 DIAGNOSIS — Z46.59 ENCOUNTER FOR FITTING AND ADJUSTMENT OF OTHER GASTROINTESTINAL APPLIANCE AND DEVICE: ICD-10-CM

## 2023-12-15 DIAGNOSIS — G47.33 OBSTRUCTIVE SLEEP APNEA (ADULT) (PEDIATRIC): ICD-10-CM

## 2023-12-15 DIAGNOSIS — F41.9 ANXIETY DISORDER, UNSPECIFIED: ICD-10-CM

## 2023-12-15 DIAGNOSIS — K21.9 GASTRO-ESOPHAGEAL REFLUX DISEASE WITHOUT ESOPHAGITIS: ICD-10-CM

## 2023-12-15 DIAGNOSIS — K80.50 CALCULUS OF BILE DUCT WITHOUT CHOLANGITIS OR CHOLECYSTITIS WITHOUT OBSTRUCTION: ICD-10-CM

## 2024-12-06 ENCOUNTER — OUTPATIENT (OUTPATIENT)
Dept: OUTPATIENT SERVICES | Facility: HOSPITAL | Age: 69
LOS: 1 days | End: 2024-12-06
Payer: MEDICARE

## 2024-12-06 ENCOUNTER — APPOINTMENT (OUTPATIENT)
Dept: MAMMOGRAPHY | Facility: IMAGING CENTER | Age: 69
End: 2024-12-06

## 2024-12-06 ENCOUNTER — APPOINTMENT (OUTPATIENT)
Dept: ULTRASOUND IMAGING | Facility: IMAGING CENTER | Age: 69
End: 2024-12-06

## 2024-12-06 DIAGNOSIS — Z98.1 ARTHRODESIS STATUS: Chronic | ICD-10-CM

## 2024-12-06 DIAGNOSIS — Z98.89 OTHER SPECIFIED POSTPROCEDURAL STATES: Chronic | ICD-10-CM

## 2024-12-06 DIAGNOSIS — Z41.9 ENCOUNTER FOR PROCEDURE FOR PURPOSES OTHER THAN REMEDYING HEALTH STATE, UNSPECIFIED: Chronic | ICD-10-CM

## 2024-12-06 DIAGNOSIS — Z90.49 ACQUIRED ABSENCE OF OTHER SPECIFIED PARTS OF DIGESTIVE TRACT: Chronic | ICD-10-CM

## 2024-12-06 DIAGNOSIS — Z98.890 OTHER SPECIFIED POSTPROCEDURAL STATES: Chronic | ICD-10-CM

## 2024-12-06 DIAGNOSIS — Z12.31 ENCOUNTER FOR SCREENING MAMMOGRAM FOR MALIGNANT NEOPLASM OF BREAST: ICD-10-CM

## 2024-12-06 DIAGNOSIS — Z98.84 BARIATRIC SURGERY STATUS: Chronic | ICD-10-CM

## 2024-12-06 DIAGNOSIS — Z98.82 BREAST IMPLANT STATUS: Chronic | ICD-10-CM

## 2024-12-06 PROCEDURE — 77063 BREAST TOMOSYNTHESIS BI: CPT

## 2024-12-06 PROCEDURE — 76641 ULTRASOUND BREAST COMPLETE: CPT

## 2024-12-06 PROCEDURE — 77067 SCR MAMMO BI INCL CAD: CPT

## 2025-01-08 ENCOUNTER — OUTPATIENT (OUTPATIENT)
Dept: OUTPATIENT SERVICES | Facility: HOSPITAL | Age: 70
LOS: 1 days | End: 2025-01-08
Payer: MEDICARE

## 2025-01-08 ENCOUNTER — APPOINTMENT (OUTPATIENT)
Dept: RADIOLOGY | Facility: IMAGING CENTER | Age: 70
End: 2025-01-08
Payer: MEDICARE

## 2025-01-08 DIAGNOSIS — Z98.89 OTHER SPECIFIED POSTPROCEDURAL STATES: Chronic | ICD-10-CM

## 2025-01-08 DIAGNOSIS — Z98.82 BREAST IMPLANT STATUS: Chronic | ICD-10-CM

## 2025-01-08 DIAGNOSIS — Z98.1 ARTHRODESIS STATUS: Chronic | ICD-10-CM

## 2025-01-08 DIAGNOSIS — Z90.49 ACQUIRED ABSENCE OF OTHER SPECIFIED PARTS OF DIGESTIVE TRACT: Chronic | ICD-10-CM

## 2025-01-08 DIAGNOSIS — Z41.9 ENCOUNTER FOR PROCEDURE FOR PURPOSES OTHER THAN REMEDYING HEALTH STATE, UNSPECIFIED: Chronic | ICD-10-CM

## 2025-01-08 DIAGNOSIS — Z98.84 BARIATRIC SURGERY STATUS: Chronic | ICD-10-CM

## 2025-01-08 DIAGNOSIS — Z98.890 OTHER SPECIFIED POSTPROCEDURAL STATES: Chronic | ICD-10-CM

## 2025-01-08 DIAGNOSIS — Z00.8 ENCOUNTER FOR OTHER GENERAL EXAMINATION: ICD-10-CM

## 2025-01-08 PROCEDURE — 77080 DXA BONE DENSITY AXIAL: CPT | Mod: 26

## 2025-01-08 PROCEDURE — 77080 DXA BONE DENSITY AXIAL: CPT

## 2025-02-03 ENCOUNTER — RX RENEWAL (OUTPATIENT)
Age: 70
End: 2025-02-03

## 2025-04-24 DIAGNOSIS — Z98.84 BARIATRIC SURGERY STATUS: ICD-10-CM

## 2025-05-07 ENCOUNTER — NON-APPOINTMENT (OUTPATIENT)
Age: 70
End: 2025-05-07

## 2025-05-08 ENCOUNTER — APPOINTMENT (OUTPATIENT)
Dept: BARIATRICS | Facility: CLINIC | Age: 70
End: 2025-05-08
Payer: MEDICARE

## 2025-05-08 ENCOUNTER — NON-APPOINTMENT (OUTPATIENT)
Age: 70
End: 2025-05-08

## 2025-05-08 ENCOUNTER — OUTPATIENT (OUTPATIENT)
Dept: OUTPATIENT SERVICES | Facility: HOSPITAL | Age: 70
LOS: 1 days | End: 2025-05-08
Payer: MEDICARE

## 2025-05-08 VITALS
WEIGHT: 157 LBS | OXYGEN SATURATION: 98 % | HEIGHT: 59 IN | DIASTOLIC BLOOD PRESSURE: 84 MMHG | HEART RATE: 75 BPM | BODY MASS INDEX: 31.65 KG/M2 | TEMPERATURE: 97.2 F | SYSTOLIC BLOOD PRESSURE: 122 MMHG

## 2025-05-08 DIAGNOSIS — Z98.84 BARIATRIC SURGERY STATUS: ICD-10-CM

## 2025-05-08 DIAGNOSIS — Z41.9 ENCOUNTER FOR PROCEDURE FOR PURPOSES OTHER THAN REMEDYING HEALTH STATE, UNSPECIFIED: Chronic | ICD-10-CM

## 2025-05-08 DIAGNOSIS — G47.33 OBSTRUCTIVE SLEEP APNEA (ADULT) (PEDIATRIC): ICD-10-CM

## 2025-05-08 DIAGNOSIS — Z98.82 BREAST IMPLANT STATUS: Chronic | ICD-10-CM

## 2025-05-08 DIAGNOSIS — I10 ESSENTIAL (PRIMARY) HYPERTENSION: ICD-10-CM

## 2025-05-08 DIAGNOSIS — F41.9 ANXIETY DISORDER, UNSPECIFIED: ICD-10-CM

## 2025-05-08 DIAGNOSIS — R63.2 POLYPHAGIA: ICD-10-CM

## 2025-05-08 DIAGNOSIS — F32.A ANXIETY DISORDER, UNSPECIFIED: ICD-10-CM

## 2025-05-08 DIAGNOSIS — Z90.49 ACQUIRED ABSENCE OF OTHER SPECIFIED PARTS OF DIGESTIVE TRACT: Chronic | ICD-10-CM

## 2025-05-08 DIAGNOSIS — F98.8 OTHER SPECIFIED BEHAVIORAL AND EMOTIONAL DISORDERS WITH ONSET USUALLY OCCURRING IN CHILDHOOD AND ADOLESCENCE: ICD-10-CM

## 2025-05-08 DIAGNOSIS — Z98.890 OTHER SPECIFIED POSTPROCEDURAL STATES: Chronic | ICD-10-CM

## 2025-05-08 DIAGNOSIS — E66.9 OBESITY, UNSPECIFIED: ICD-10-CM

## 2025-05-08 DIAGNOSIS — Z98.89 OTHER SPECIFIED POSTPROCEDURAL STATES: Chronic | ICD-10-CM

## 2025-05-08 DIAGNOSIS — R63.5 ABNORMAL WEIGHT GAIN: ICD-10-CM

## 2025-05-08 DIAGNOSIS — E46 UNSPECIFIED PROTEIN-CALORIE MALNUTRITION: ICD-10-CM

## 2025-05-08 DIAGNOSIS — R63.8 OTHER SYMPTOMS AND SIGNS CONCERNING FOOD AND FLUID INTAKE: ICD-10-CM

## 2025-05-08 DIAGNOSIS — Z98.84 BARIATRIC SURGERY STATUS: Chronic | ICD-10-CM

## 2025-05-08 DIAGNOSIS — Z98.1 ARTHRODESIS STATUS: Chronic | ICD-10-CM

## 2025-05-08 DIAGNOSIS — E78.00 PURE HYPERCHOLESTEROLEMIA, UNSPECIFIED: ICD-10-CM

## 2025-05-08 PROCEDURE — 74220 X-RAY XM ESOPHAGUS 1CNTRST: CPT | Mod: 26

## 2025-05-08 PROCEDURE — 99213 OFFICE O/P EST LOW 20 MIN: CPT

## 2025-05-08 PROCEDURE — 74220 X-RAY XM ESOPHAGUS 1CNTRST: CPT

## 2025-05-12 ENCOUNTER — RX RENEWAL (OUTPATIENT)
Age: 70
End: 2025-05-12

## 2025-05-14 ENCOUNTER — APPOINTMENT (OUTPATIENT)
Dept: GASTROENTEROLOGY | Facility: CLINIC | Age: 70
End: 2025-05-14

## 2025-06-26 ENCOUNTER — APPOINTMENT (OUTPATIENT)
Dept: BARIATRICS/WEIGHT MGMT | Facility: CLINIC | Age: 70
End: 2025-06-26

## (undated) DEVICE — LOK DVC RX AND BIOPSY

## (undated) DEVICE — ELCTR NESSY OMEGA RETURN 85CM W/4M CBL

## (undated) DEVICE — SYR LUER SLIP TIP 30CC

## (undated) DEVICE — TUBING SUCTION 20FT

## (undated) DEVICE — INJ SYS RAP REFIL

## (undated) DEVICE — DRSG CURITY GAUZE SPONGE 4 X 4" 12-PLY

## (undated) DEVICE — PLASTIC SOLUTION BOWL 160Z

## (undated) DEVICE — POSITIONER FOAM SLOTTED HEAD CRADLE (PINK)

## (undated) DEVICE — STERIS DEFENDO 3-PIECE KIT (AIR/WATER, SUCTION & BIOPSY VALVES)

## (undated) DEVICE — SNARE POLYP SENS 27MM 240CM

## (undated) DEVICE — WARMING BLANKET LOWER ADULT

## (undated) DEVICE — BRUSH CYTO RAP EXCHG 3MM

## (undated) DEVICE — VENODYNE/SCD SLEEVE CALF MEDIUM

## (undated) DEVICE — SOL IRR POUR H2O 1000ML